# Patient Record
Sex: FEMALE | Race: WHITE | HISPANIC OR LATINO | Employment: FULL TIME | URBAN - METROPOLITAN AREA
[De-identification: names, ages, dates, MRNs, and addresses within clinical notes are randomized per-mention and may not be internally consistent; named-entity substitution may affect disease eponyms.]

---

## 2019-09-16 ENCOUNTER — OFFICE VISIT (OUTPATIENT)
Dept: CARDIOLOGY | Facility: CLINIC | Age: 69
End: 2019-09-16
Payer: MEDICARE

## 2019-09-16 VITALS
OXYGEN SATURATION: 96 % | HEIGHT: 64 IN | BODY MASS INDEX: 30.3 KG/M2 | SYSTOLIC BLOOD PRESSURE: 125 MMHG | DIASTOLIC BLOOD PRESSURE: 82 MMHG | HEART RATE: 69 BPM | WEIGHT: 177.5 LBS

## 2019-09-16 DIAGNOSIS — M25.562 CHRONIC PAIN OF BOTH KNEES: ICD-10-CM

## 2019-09-16 DIAGNOSIS — M25.561 CHRONIC PAIN OF BOTH KNEES: ICD-10-CM

## 2019-09-16 DIAGNOSIS — R60.0 LOWER EXTREMITY EDEMA: ICD-10-CM

## 2019-09-16 DIAGNOSIS — R06.02 SHORTNESS OF BREATH: ICD-10-CM

## 2019-09-16 DIAGNOSIS — I73.9 CLAUDICATION: ICD-10-CM

## 2019-09-16 DIAGNOSIS — G89.29 CHRONIC PAIN OF BOTH KNEES: ICD-10-CM

## 2019-09-16 DIAGNOSIS — I87.2 VENOUS INSUFFICIENCY OF BOTH LOWER EXTREMITIES: ICD-10-CM

## 2019-09-16 PROCEDURE — 99499 RISK ADDL DX/OHS AUDIT: ICD-10-PCS | Mod: HCNC,S$GLB,, | Performed by: INTERNAL MEDICINE

## 2019-09-16 PROCEDURE — 99204 PR OFFICE/OUTPT VISIT, NEW, LEVL IV, 45-59 MIN: ICD-10-PCS | Mod: HCNC,S$GLB,, | Performed by: INTERNAL MEDICINE

## 2019-09-16 PROCEDURE — 99499 UNLISTED E&M SERVICE: CPT | Mod: HCNC,S$GLB,, | Performed by: INTERNAL MEDICINE

## 2019-09-16 PROCEDURE — 99999 PR PBB SHADOW E&M-NEW PATIENT-LVL III: ICD-10-PCS | Mod: PBBFAC,HCNC,, | Performed by: INTERNAL MEDICINE

## 2019-09-16 PROCEDURE — 99999 PR PBB SHADOW E&M-NEW PATIENT-LVL III: CPT | Mod: PBBFAC,HCNC,, | Performed by: INTERNAL MEDICINE

## 2019-09-16 PROCEDURE — 1101F PR PT FALLS ASSESS DOC 0-1 FALLS W/OUT INJ PAST YR: ICD-10-PCS | Mod: HCNC,CPTII,S$GLB, | Performed by: INTERNAL MEDICINE

## 2019-09-16 PROCEDURE — 93000 ELECTROCARDIOGRAM COMPLETE: CPT | Mod: HCNC,S$GLB,, | Performed by: INTERNAL MEDICINE

## 2019-09-16 PROCEDURE — 99204 OFFICE O/P NEW MOD 45 MIN: CPT | Mod: HCNC,S$GLB,, | Performed by: INTERNAL MEDICINE

## 2019-09-16 PROCEDURE — 93000 EKG 12-LEAD: ICD-10-PCS | Mod: HCNC,S$GLB,, | Performed by: INTERNAL MEDICINE

## 2019-09-16 PROCEDURE — 1101F PT FALLS ASSESS-DOCD LE1/YR: CPT | Mod: HCNC,CPTII,S$GLB, | Performed by: INTERNAL MEDICINE

## 2019-09-16 NOTE — PATIENT INSTRUCTIONS
Insuficiencia venosa crónica  Los problemas con las venas en las piernas pueden llevar a you insuficiencia venosa crónica, lo que significa que hay un problema de larga duración en las venas que pueden bombear la shar de regreso al corazón. Cuando esto sucede, las angre permanece en las venas provocando hinchazón y dolor.     Dos problemas que pueden llevar a you insuficiencia venosa profunda son:  · Válvulas deterioradas. Las válvulas mantienen fluyendo la shar de las piernas a través de los vasos sanguíneos de regreso al corazón. Cuando las válvulas están deterioradas, la shar no puede fluir tan leanne.  · Trombosis venosa profunda. Pueden formarse coágulos de shar en las venas profundas de las piernas. [place] puede causar dolor, enrojecimiento e hinchazón de las piernas. También pueden bloquear el flujo de shar de regreso al corazón. Busque asistencia médcia de inmediato si tiene estos síntomas.  ·  Un coágulo de shar en you pierna puede desprenderse y desplazarse hasta los pulmones. [place] se llama embolia pulmonar. En los pulmones. el coágulo puede detener el flujo de shar, lo que puede producir dolor de pecho, dificultad para respirar, ritmo cardíaco acelerado, tos (se puede toser shar), y desmayos. [place] es you emegencia médica y puede provocar la muerte. Llame al 911 si tiene estos síntomas.  · Los proveedores de atención médica se refieren a estos dos problemas francois un tromboembolismo venoso.   La insuficiencia venosa crónica no tiene kareen, archie usted puede controlar la hinchazón en las piernas para reducir la probabilidad de ulceraciones (heridas).  Cómo reconocer los síntomas  Vigile cualquiera de estos:  · Después de estar kojo rato parado o sentado con los pies en el suelo, siente dolor o pesadez en las piernas.  · La hinchazón en los tobillos es probablemente el síntoma más común de la insuficiencia venosa crónica.  · A medida que aumenta la hinchazón, pueden aparecer manchas rojizas en la  piel de la marce de los tobillos, la cual puede también adquirir un matiz color café y un aspecto correoso o escamado, y producir picazón.  · Si la hinchazón no se controla, se puede formar you úlcera (herida abierta).  Lo que usted puede hacer  Reduzca el riesgo de desarrollar úlceras siguiendo estos consejos:  · Aumente el flujo sanguíneo que regresa al corazón elevando las piernas, haciendo ejercicio todos los wai y vistiendo medias elásticas.  · Pierda el exceso de peso para aumentar la circulación en las piernas.  · Para estimular la circulación en las piernas cuando tenga que estar parado o sentado en un lugar mirna un kojo período, mueva los dedos de los pies, cambie de posición y póngase de puntillas (levantando y bajando los talones).  Date Last Reviewed:   © 3810-3975 [employer]. [address] [place] [address], [place] [address]. Todos los derechos reservados. Esta información no pretende sustituir la atención médica profesional. Sólo cedillo médico puede diagnosticar y tratar un problema de dexter.

## 2019-09-16 NOTE — PROGRESS NOTES
Subjective:   @Patient ID:  Delmy Fairbanks is a 69 y.o. female who presents for evaluation of  B/l le pain and venous insuffiencey     HPI:    Patient was referred for bilateral lower extremity pain and edema. Patient speaks Chinese only.   She has been having bilateral knee pain, that happens at rest and with exertion. She has knee pain and feels that her knees lock in a certain position.   The pain is sometimes stabbing. Also reports LE edema that responded to lasix.  No ulcers or wounds noted.    No know arthritis problems.    She stated that she doesn't;t have any medical problem.   She smokes about 5 cigarettes a day for  Few yeas has been smoking.     No prior cardiac work up.          Patient Active Problem List    Diagnosis Date Noted    Venous insufficiency of both lower extremities 09/16/2019    Lower extremity edema 09/16/2019    Claudication 09/16/2019    Knee pain, bilateral 09/16/2019           Left Arm BP - Sitting: (P) 119/77      Review of Systems   Constitution: Negative for fever.   HENT: Negative for hearing loss and nosebleeds.    Cardiovascular: Negative for chest pain and dyspnea on exertion.   Respiratory: Negative for shortness of breath.    Hematologic/Lymphatic: Negative for bleeding problem.   Skin: Negative for itching.   Musculoskeletal:        As in HPI   Gastrointestinal: Negative for abdominal pain and hematochezia.   Genitourinary: Negative for hematuria.   Neurological: Negative for dizziness and loss of balance.   Psychiatric/Behavioral: Negative for altered mental status and depression.       Objective:   Physical Exam   Constitutional: She is oriented to person, place, and time. She appears well-developed and well-nourished.   HENT:   Head: Normocephalic and atraumatic.   Eyes: Conjunctivae are normal.   Neck: Neck supple. No JVD present.   Cardiovascular: Normal rate, regular rhythm and normal heart sounds. Exam reveals no gallop and no friction rub.   No murmur  heard.  Pulmonary/Chest: Effort normal and breath sounds normal. No stridor. No respiratory distress. She has no wheezes.   Musculoskeletal: She exhibits edema (+1 b/l ).   Neurological: She is alert and oriented to person, place, and time.   Skin: Skin is warm and dry. Rash: B/l superficial varicose veins noted on examination.    Psychiatric: She has a normal mood and affect. Her behavior is normal.       Assessment:     1. Venous insufficiency of both lower extremities    2. Lower extremity edema    3. Claudication    4. Chronic pain of both knees        Plan:     - Will check venous u/s  insuffiencey protocol  To rule out any significant insuffiencey.     - Also check ANALI, given the age and smoking hx  As well as the symptoms.     - I suspect that major part of her symptoms is related to knee issues. Will refer to orthopedic team for evaluation.  - Check Echo to evaluate systolic function and rule out cardiac etiology of the b/l LE edema.     EKG is SR , no acute ST-T wave changes.     Continue with current medical plan and lifestyle changes.    Return sooner for concerns or questions. If symptoms persist go to the ED    I have reviewed all pertinent data including patient's medical history in detail and updated the computerized patient record.     Orders Placed This Encounter   Procedures    Ambulatory referral/consult to Orthopedics     Standing Status:   Future     Standing Expiration Date:   10/16/2020     Referral Priority:   Routine     Referral Type:   Consultation     Requested Specialty:   Orthopedic Surgery     Number of Visits Requested:   1    CV Ultrasound doppler venous legs bilat     Standing Status:   Future     Standing Expiration Date:   9/16/2020    CV Ankle Brachial Indices W Stress W Toe Tracings     Standing Status:   Future     Standing Expiration Date:   9/16/2020    IN OFFICE EKG 12-LEAD (to Muse)     Order Specific Question:   Diagnosis     Answer:   Shortness of breath  [786.05.ICD-9-CM]    Echo Color Flow Doppler? Yes     Standing Status:   Future     Standing Expiration Date:   9/16/2020     Order Specific Question:   Color Flow Doppler?     Answer:   Yes       Follow up as scheduled.     She expressed verbal understanding and agreed with the plan    Patient's Medications    No medications on file

## 2019-09-16 NOTE — LETTER
September 16, 2019      Noman Faith, ALEXANDRO  [address]  [address]  [facility] [address]           [place] - Cardiology  [address]  [facility] [address]  Phone: [phone]          Patient: Delmy Fairbanks   MR Number: [id number]   YOB: 1950   Date of Visit: 9/16/2019       Dear Noman Faith:    Thank you for referring Delmy Fairbanks to me for evaluation. Attached you will find relevant portions of my assessment and plan of care.    If you have questions, please do not hesitate to call me. I look forward to following Delmy Fairbanks along with you.    Sincerely,    Cesar Arriola MD    Enclosure  CC:  No Recipients    If you would like to receive this communication electronically, please contact [email or web address] or [phone] to request more information on [employer] Link access.    For providers and/or their staff who would like to refer a patient to [facility], please contact us through our one-stop-shop provider referral line, [facility], at [phone].    If you feel you have received this communication in error or would no longer like to receive these types of communications, please e-mail [email or web address]

## 2019-09-30 ENCOUNTER — HOSPITAL ENCOUNTER (OUTPATIENT)
Dept: CARDIOLOGY | Facility: HOSPITAL | Age: 69
Discharge: HOME | End: 2019-09-30
Attending: INTERNAL MEDICINE
Payer: MEDICARE

## 2019-09-30 ENCOUNTER — HOSPITAL ENCOUNTER (OUTPATIENT)
Dept: RADIOLOGY | Facility: HOSPITAL | Age: 69
Discharge: HOME | End: 2019-09-30
Attending: INTERNAL MEDICINE
Payer: MEDICARE

## 2019-09-30 DIAGNOSIS — I87.2 VENOUS INSUFFICIENCY OF BOTH LOWER EXTREMITIES: ICD-10-CM

## 2019-09-30 DIAGNOSIS — R60.0 LOWER EXTREMITY EDEMA: ICD-10-CM

## 2019-09-30 DIAGNOSIS — I73.9 CLAUDICATION: ICD-10-CM

## 2019-09-30 LAB
AORTIC ROOT ANNULUS: 3.17 CM
AORTIC VALVE CUSP SEPERATION: 1.84 CM
AV INDEX (PROSTH): 0.88
AV MEAN GRADIENT: 3 MMHG
AV PEAK GRADIENT: 7 MMHG
AV VALVE AREA: 3.1 CM2
AV VELOCITY RATIO: 0.88
CV ECHO LV RWT: 0.42 CM
DOP CALC AO PEAK VEL: 1.35 M/S
DOP CALC AO VTI: 26.31 CM
DOP CALC LVOT AREA: 3.5 CM2
DOP CALC LVOT DIAMETER: 2.12 CM
DOP CALC LVOT PEAK VEL: 1.19 M/S
DOP CALC LVOT STROKE VOLUME: 81.64 CM3
DOP CALCLVOT PEAK VEL VTI: 23.14 CM
E WAVE DECELERATION TIME: 246.64 MSEC
E/A RATIO: 1.13
ECHO LV POSTERIOR WALL: 0.94 CM (ref 0.6–1.1)
FRACTIONAL SHORTENING: 51 % (ref 28–44)
INTERVENTRICULAR SEPTUM: 1 CM (ref 0.6–1.1)
IVRT: 0.1 MSEC
LA MAJOR: 4.83 CM
LA MINOR: 4.96 CM
LA WIDTH: 4.24 CM
LEFT ABI: 1.22
LEFT ARM BP: 119 MMHG
LEFT ATRIUM SIZE: 2.18 CM
LEFT ATRIUM VOLUME: 38.45 CM3
LEFT DORSALIS PEDIS: 147 MMHG
LEFT INTERNAL DIMENSION IN SYSTOLE: 2.2 CM (ref 2.1–4)
LEFT POSTERIOR TIBIAL: 153 MMHG
LEFT TBI: 0.65
LEFT TOE PRESSURE: 81 MMHG
LEFT VENTRICLE DIASTOLIC VOLUME: 83.51 ML
LEFT VENTRICLE SYSTOLIC VOLUME: 37.74 ML
LEFT VENTRICULAR INTERNAL DIMENSION IN DIASTOLE: 4.5 CM (ref 3.5–6)
LEFT VENTRICULAR MASS: 147 G
MV PEAK A VEL: 0.63 M/S
MV PEAK E VEL: 0.71 M/S
PISA TR MAX VEL: 2.08 M/S
PULM VEIN S/D RATIO: 1
PV PEAK D VEL: 0.34 M/S
PV PEAK S VEL: 0.34 M/S
RA MAJOR: 4.31 CM
RA PRESSURE: 3 MMHG
RIGHT ABI: 1.11
RIGHT ARM BP: 125 MMHG
RIGHT DORSALIS PEDIS: 139 MMHG
RIGHT POSTERIOR TIBIAL: 130 MMHG
RIGHT TBI: 0.44
RIGHT TOE PRESSURE: 55 MMHG
RIGHT VENTRICULAR END-DIASTOLIC DIMENSION: 2.43 CM
TR MAX PG: 17 MMHG
TREADMILL SPEED: 0.06 MPH
TREADMILL TIME: 0.58 MIN
TV REST PULMONARY ARTERY PRESSURE: 20 MMHG

## 2019-09-30 PROCEDURE — 93970 US LOWER EXTREMITY VEINS BILATERAL INSUFFICIENCY: ICD-10-PCS | Mod: 26,HCNC,, | Performed by: RADIOLOGY

## 2019-09-30 PROCEDURE — 93306 ECHO (CUPID ONLY): ICD-10-PCS | Mod: 26,HCNC,, | Performed by: INTERNAL MEDICINE

## 2019-09-30 PROCEDURE — 93924 LWR XTR VASC STDY BILAT: CPT | Mod: 50,HCNC

## 2019-09-30 PROCEDURE — 93924 LWR XTR VASC STDY BILAT: CPT | Mod: 26,HCNC,, | Performed by: INTERNAL MEDICINE

## 2019-09-30 PROCEDURE — 93970 EXTREMITY STUDY: CPT | Mod: 26,HCNC,, | Performed by: RADIOLOGY

## 2019-09-30 PROCEDURE — 93306 TTE W/DOPPLER COMPLETE: CPT | Mod: HCNC

## 2019-09-30 PROCEDURE — 93970 EXTREMITY STUDY: CPT | Mod: TC,HCNC

## 2019-09-30 PROCEDURE — 93924 CV US ANKLE BRACHIAL INDICES W STRESS W TOE TRACINGS (CUPID ONLY): ICD-10-PCS | Mod: 26,HCNC,, | Performed by: INTERNAL MEDICINE

## 2019-09-30 PROCEDURE — 93306 TTE W/DOPPLER COMPLETE: CPT | Mod: 26,HCNC,, | Performed by: INTERNAL MEDICINE

## 2019-10-01 ENCOUNTER — TELEPHONE (OUTPATIENT)
Dept: CARDIOLOGY | Facility: CLINIC | Age: 69
End: 2019-10-01

## 2019-10-01 DIAGNOSIS — R60.0 LOWER EXTREMITY EDEMA: Primary | ICD-10-CM

## 2019-10-01 RX ORDER — FUROSEMIDE 20 MG/1
20 TABLET ORAL DAILY PRN
Qty: 30 TABLET | Refills: 11 | Status: SHIPPED | OUTPATIENT
Start: 2019-10-01 | End: 2020-09-30

## 2019-10-01 NOTE — PROGRESS NOTES
The patient speaks Congolese only. Please let her know that I have reviewed the results. ECHO results is ok, just showed a little stiff heart muscle  which is common with age. Nothing needs to be done for it except I would like to prescribe low dose lasix. I don't see  preferred pharmacy, please ask her about one. Also tests that were done on her legs showed that   her legs are getting enough circulation but she has varicose veins so please instruct her to get compression stocking and wear them. Also, to elevate her legs  for 30 minutes 3-4 times a day. Will reassess next visit. Thank you.

## 2019-10-01 NOTE — TELEPHONE ENCOUNTER
Lashanda spoke with patient she was advised about the results. Pharmacy is in the chart,  you can send prescription. Patient was advised about new prescription and agrees with plan.

## 2019-10-17 ENCOUNTER — TELEPHONE (OUTPATIENT)
Dept: VASCULAR SURGERY | Facility: CLINIC | Age: 69
End: 2019-10-17

## 2019-10-17 ENCOUNTER — OFFICE VISIT (OUTPATIENT)
Dept: CARDIOLOGY | Facility: CLINIC | Age: 69
End: 2019-10-17
Payer: MEDICARE

## 2019-10-17 VITALS
HEIGHT: 64 IN | BODY MASS INDEX: 30.7 KG/M2 | SYSTOLIC BLOOD PRESSURE: 108 MMHG | DIASTOLIC BLOOD PRESSURE: 69 MMHG | HEART RATE: 65 BPM | WEIGHT: 179.81 LBS | OXYGEN SATURATION: 97 %

## 2019-10-17 DIAGNOSIS — G89.29 CHRONIC PAIN OF BOTH KNEES: Primary | ICD-10-CM

## 2019-10-17 DIAGNOSIS — I87.2 VENOUS INSUFFICIENCY OF BOTH LOWER EXTREMITIES: ICD-10-CM

## 2019-10-17 DIAGNOSIS — M25.561 CHRONIC PAIN OF BOTH KNEES: Primary | ICD-10-CM

## 2019-10-17 DIAGNOSIS — M25.562 CHRONIC PAIN OF BOTH KNEES: Primary | ICD-10-CM

## 2019-10-17 DIAGNOSIS — R60.0 LOWER EXTREMITY EDEMA: ICD-10-CM

## 2019-10-17 PROCEDURE — 1101F PR PT FALLS ASSESS DOC 0-1 FALLS W/OUT INJ PAST YR: ICD-10-PCS | Mod: HCNC,CPTII,S$GLB, | Performed by: INTERNAL MEDICINE

## 2019-10-17 PROCEDURE — 99999 PR PBB SHADOW E&M-EST. PATIENT-LVL III: ICD-10-PCS | Mod: PBBFAC,HCNC,, | Performed by: INTERNAL MEDICINE

## 2019-10-17 PROCEDURE — 1101F PT FALLS ASSESS-DOCD LE1/YR: CPT | Mod: HCNC,CPTII,S$GLB, | Performed by: INTERNAL MEDICINE

## 2019-10-17 PROCEDURE — 99999 PR PBB SHADOW E&M-EST. PATIENT-LVL III: CPT | Mod: PBBFAC,HCNC,, | Performed by: INTERNAL MEDICINE

## 2019-10-17 PROCEDURE — 99214 PR OFFICE/OUTPT VISIT, EST, LEVL IV, 30-39 MIN: ICD-10-PCS | Mod: HCNC,S$GLB,, | Performed by: INTERNAL MEDICINE

## 2019-10-17 PROCEDURE — 99214 OFFICE O/P EST MOD 30 MIN: CPT | Mod: HCNC,S$GLB,, | Performed by: INTERNAL MEDICINE

## 2019-10-17 NOTE — PROGRESS NOTES
Subjective:   @Patient ID:  Delmy Fairbanks is a 69 y.o. female who presents for follow-up of  B/l le pain and venous insuffiencey     HPI:   Here for follow up of test results.   She stated that she has been ok.  Swelling is better when she uses the compression stocking.  The knee pain is severe, she hasn't see the orthopedic team yet.  There is associated swelling worse on the right side.  She is taking lasix, but it doesn't help much.          ECHO showed normal LV function with garde II DD.  ANALI is normal.   U/S showed B/l  GSV insuffiencey.      No ulcers or wounds noted.        She smokes about 5 cigarettes a day for few yeas has been smoking.               Patient Active Problem List    Diagnosis Date Noted    Venous insufficiency of both lower extremities 09/16/2019    Lower extremity edema 09/16/2019    Claudication 09/16/2019    Knee pain, bilateral 09/16/2019                  Review of Systems   Constitution: Negative for fever.   HENT: Negative for hearing loss and nosebleeds.    Cardiovascular: Negative for chest pain and dyspnea on exertion.   Respiratory: Negative for shortness of breath.    Hematologic/Lymphatic: Negative for bleeding problem.   Skin: Negative for itching.   Musculoskeletal:        As in HPI   Gastrointestinal: Negative for abdominal pain and hematochezia.   Genitourinary: Negative for hematuria.   Neurological: Negative for dizziness and loss of balance.   Psychiatric/Behavioral: Negative for altered mental status and depression.       Objective:   Physical Exam   Constitutional: She is oriented to person, place, and time. She appears well-developed and well-nourished.   HENT:   Head: Normocephalic and atraumatic.   Eyes: Conjunctivae are normal.   Neck: Neck supple. No JVD present.   Cardiovascular: Normal rate, regular rhythm and normal heart sounds. Exam reveals no gallop and no friction rub.   No murmur heard.  Pulmonary/Chest: Effort normal and breath sounds normal.  No stridor. No respiratory distress. She has no wheezes.   Musculoskeletal: She exhibits edema (+1 b/l ).   Neurological: She is alert and oriented to person, place, and time.   Skin: Skin is warm and dry. Rash: B/l superficial varicose veins noted on examination.    Psychiatric: She has a normal mood and affect. Her behavior is normal.       Assessment:     1. Chronic pain of both knees    2. Lower extremity edema    3. Venous insufficiency of both lower extremities        Plan:     -  I believe a major part of her symptoms related to the arthritis.   -  Will refer for ortho for evaluation   -  Continue compression stocking and leg elevation for the venous insuffiencey.   -  Continue lasix for diastolic dysfunction.   - After  Knee evaluation if the patient remains symptomatic may consider intervention ot the venous insuffiencey.       Continue with current medical plan and lifestyle changes.    Return sooner for concerns or questions. If symptoms persist go to the ED    I have reviewed all pertinent data including patient's medical history in detail and updated the computerized patient record.     Orders Placed This Encounter   Procedures    Ambulatory referral/consult to Orthopedics     Standing Status:   Future     Standing Expiration Date:   11/17/2020     Referral Priority:   Routine     Referral Type:   Consultation     Requested Specialty:   Orthopedic Surgery     Number of Visits Requested:   1       Follow up as scheduled.     She expressed verbal understanding and agreed with the plan    Patient's Medications   New Prescriptions    No medications on file   Previous Medications    FUROSEMIDE (LASIX) 20 MG TABLET    Take 1 tablet (20 mg total) by mouth daily as needed (For Lower extremity  swelling.).   Modified Medications    No medications on file   Discontinued Medications    No medications on file

## 2019-10-17 NOTE — PATIENT INSTRUCTIONS
How to Quit Smoking  Smoking is one of the hardest habits to break. About half of all people who have ever smoked have been able to quit. Most people who still smoke want to quit. Here are some of the best ways to stop smoking.    Keep trying  Most smokers make many attempts at quitting before they are successful. Its important not to give up.  Go cold turkey  Most former smokers quit cold turkey (all at once). Trying to cut back gradually doesn't seem to work as well, perhaps because it continues the smoking habit. Also, it is possible to inhale more while smoking fewer cigarettes. This results in the same amount of nicotine in your body.  Get support  Support programs can be a big help, especially for heavy smokers. These groups offer lectures, ways to change behavior, and peer support. Here are some ways to find a support program:  · Free national quitline: 800-QUIT-NOW (251-029-8165).  · Hospital quit-smoking programs.  · American Lung Association: (781.603.8454).  · American Cancer Society (778-226-0115).  Support at home is important too. Nonsmokers can offer praise and encouragement. If the smoker in your life finds it hard to quit, encourage them to keep trying.  Over-the-counter medicines  Nicotine replacement therapy may make quitting easier. Certain aids, such as the nicotine patch, gum, and lozenges, are available without a prescription. It is best to use these under a doctors care, though. The skin patch provides a steady supply of nicotine. Nicotine gum and lozenges give temporary bursts of low levels of nicotine. Both methods reduce the craving for cigarettes. Warning: If you have nausea, vomiting, dizziness, weakness, or a fast heartbeat, stop using these products and see your doctor.  Prescription medicines  After reviewing your smoking patterns and past attempts to quit, your doctor may offer a prescription medicine such as bupropion, varenicline, a nicotine inhaler, or nasal spray. Each has  "advantages and side effects. Your doctor can review these with you.  Health benefits of quitting  The benefits of quitting start right away and keep improving the longer you go without smoking. These benefits occur at any age.  So whether you are 17 or 70, quitting is a good decision. Some of the benefits include:  · 20 minutes: Blood pressure and pulse return to normal.  · 8 hours: Oxygen levels return to normal.  · 2 days: Ability to smell and taste begin to improve as damaged nerves regrow.  · 2 to 3 weeks: Circulation and lung function improve.  · 1 to 9 months: Coughing, congestion, and shortness of breath decrease; tiredness decreases.  · 1 year: Risk of heart attack decreases by half.  · 5 years: Risk of lung cancer decreases by half; risk of stroke becomes the same as a nonsmokers.  For more on how to quit smoking, try these online resources:   · Smokefree.gov  · "Clearing the Air" booklet from the National Cancer Adams: smokefree.gov/sites/default/files/pdf/clearing-the-air-accessible.pdf  Date Last Reviewed: 3/1/2017  © 6034-3631 [employer]. [address] [place] [address]. All rights reserved. This information is not intended as a substitute for professional medical care. Always follow your healthcare professional's instructions.          "

## 2019-10-23 ENCOUNTER — HOSPITAL ENCOUNTER (OUTPATIENT)
Dept: RADIOLOGY | Facility: HOSPITAL | Age: 69
Discharge: HOME | End: 2019-10-23
Attending: PHYSICIAN ASSISTANT
Payer: MEDICARE

## 2019-10-23 ENCOUNTER — OFFICE VISIT (OUTPATIENT)
Dept: ORTHOPEDICS | Facility: CLINIC | Age: 69
End: 2019-10-23
Payer: MEDICARE

## 2019-10-23 VITALS — HEIGHT: 64 IN | WEIGHT: 179 LBS | BODY MASS INDEX: 30.56 KG/M2

## 2019-10-23 DIAGNOSIS — G89.29 CHRONIC PAIN OF BOTH KNEES: ICD-10-CM

## 2019-10-23 DIAGNOSIS — M25.562 CHRONIC PAIN OF BOTH KNEES: ICD-10-CM

## 2019-10-23 DIAGNOSIS — M25.561 CHRONIC PAIN OF BOTH KNEES: ICD-10-CM

## 2019-10-23 DIAGNOSIS — M17.0 PRIMARY OSTEOARTHRITIS OF BOTH KNEES: Primary | ICD-10-CM

## 2019-10-23 PROCEDURE — 1101F PT FALLS ASSESS-DOCD LE1/YR: CPT | Mod: HCNC,CPTII,S$GLB, | Performed by: PHYSICIAN ASSISTANT

## 2019-10-23 PROCEDURE — 99204 OFFICE O/P NEW MOD 45 MIN: CPT | Mod: HCNC,S$GLB,, | Performed by: PHYSICIAN ASSISTANT

## 2019-10-23 PROCEDURE — 1101F PR PT FALLS ASSESS DOC 0-1 FALLS W/OUT INJ PAST YR: ICD-10-PCS | Mod: HCNC,CPTII,S$GLB, | Performed by: PHYSICIAN ASSISTANT

## 2019-10-23 PROCEDURE — 73564 X-RAY EXAM KNEE 4 OR MORE: CPT | Mod: TC,50,HCNC

## 2019-10-23 PROCEDURE — 99999 PR PBB SHADOW E&M-EST. PATIENT-LVL III: CPT | Mod: PBBFAC,HCNC,, | Performed by: PHYSICIAN ASSISTANT

## 2019-10-23 PROCEDURE — 73564 XR KNEE ORTHO BILAT WITH FLEXION: ICD-10-PCS | Mod: 26,50,HCNC, | Performed by: RADIOLOGY

## 2019-10-23 PROCEDURE — 99204 PR OFFICE/OUTPT VISIT, NEW, LEVL IV, 45-59 MIN: ICD-10-PCS | Mod: HCNC,S$GLB,, | Performed by: PHYSICIAN ASSISTANT

## 2019-10-23 PROCEDURE — 99999 PR PBB SHADOW E&M-EST. PATIENT-LVL III: ICD-10-PCS | Mod: PBBFAC,HCNC,, | Performed by: PHYSICIAN ASSISTANT

## 2019-10-23 PROCEDURE — 73564 X-RAY EXAM KNEE 4 OR MORE: CPT | Mod: 26,50,HCNC, | Performed by: RADIOLOGY

## 2019-10-23 RX ORDER — NAPROXEN 500 MG/1
500 TABLET ORAL 2 TIMES DAILY WITH MEALS
Qty: 60 TABLET | Refills: 0 | Status: SHIPPED | OUTPATIENT
Start: 2019-10-23

## 2019-10-23 RX ORDER — CHLORTHALIDONE 25 MG/1
25 TABLET ORAL DAILY
COMMUNITY

## 2019-10-23 RX ORDER — ERGOCALCIFEROL 1.25 MG/1
50000 CAPSULE ORAL
COMMUNITY

## 2019-10-23 NOTE — LETTER
October 23, 2019      Cesar Arriola MD  [address] [place] [address]           [facility] - Orthopedics  [address]  [facility] [address]  Phone: [phone]          Patient: Delmy Fairbanks   MR Number: [id number]   YOB: 1950   Date of Visit: 10/23/2019       Dear Dr. Cesar Arriola:    Thank you for referring Delmy Fairbanks to me for evaluation. Attached you will find relevant portions of my assessment and plan of care.    If you have questions, please do not hesitate to call me. I look forward to following Delmy Fairbanks along with you.    Sincerely,    Shiv Birmingham PA-C    Enclosure  CC:  No Recipients    If you would like to receive this communication electronically, please contact [email or web address] or [phone] to request more information on [employer] Link access.    For providers and/or their staff who would like to refer a patient to [facility], please contact us through our one-stop-shop provider referral line, [facility], at [phone].    If you feel you have received this communication in error or would no longer like to receive these types of communications, please e-mail [email or web address]

## 2019-10-23 NOTE — PROGRESS NOTES
SUBJECTIVE:     Chief Complaint & History of Present Illness:  Delmy Fairbanks is a 69 y.o. year old female, new patient referred to Orthopedics by Dr. Arriola, here with a history of constant bilateral knee pain, right worse than left, which started 2+ years ago.  There is not a history of trauma.  The pain is located in the global aspect of the knee.  The pain is described as achy, 10/10.  There is radiation down her right tibia.  There is catching and locking of her right knee.  Aggravating factors include any weight bearing.  Associated symptoms include effusion, knee giving out, crepitus sensation.  There is not numbness or tingling of the lower extremity.  There is not back pain. Previous treatments include brace, ice, OTC NSAIDs and rest which have provided minimal relief.  There is not a history of previous injury or surgery to the knee.  The patient does not use an assistive device.    Patient recently moved from [place] where she saw an Orthopedist initially.  She was told she had severe osteoarthritis of her right knee and that her left knee was bothering her due to overcompensation.      present to assist with HPI.    Review of patient's allergies indicates:  No Known Allergies      Current Outpatient Medications   Medication Sig Dispense Refill    chlorthalidone (HYGROTEN) 25 MG Tab Take 25 mg by mouth once daily.      ergocalciferol (VITAMIN D2) 50,000 unit Cap Take 50,000 Units by mouth every 7 days.      furosemide (LASIX) 20 MG tablet Take 1 tablet (20 mg total) by mouth daily as needed (For Lower extremity  swelling.). 30 tablet 11     No current facility-administered medications for this visit.        Past Medical History:   Diagnosis Date    Hypertension        History reviewed. No pertinent surgical history.    Vital Signs (Most Recent)  There were no vitals filed for this visit.    Review of Systems:  ROS:  Constitutional: no fever or chills  Eyes: no visual changes  ENT:  "no nasal congestion or sore throat  Respiratory: no cough or shortness of breath  Cardiovascular: no chest pain or palpitations  Gastrointestinal: no nausea or vomiting, tolerating diet  Genitourinary: no hematuria or dysuria  Integument/Breast: no rash or pruritis  Hematologic/Lymphatic: no easy bruising or lymphadenopathy  Musculoskeletal: positive for bilateral knee pain  Neurological: no seizures or tremors  Behavioral/Psych: no auditory or visual hallucinations  Endocrine: no heat or cold intolerance    OBJECTIVE:     PHYSICAL EXAM:  PE:  Ht 5' 4" (1.626 m)   Wt 81.2 kg (179 lb)   BMI 30.73 kg/m²   General: Pleasant, cooperative, NAD   HEENT: NCAT, sclera nonicteric   Lungs: Respirations are equal and unlabored.   Abdomen: Soft and non-tender.  CV: 2+ bilateral upper and lower extremity pulses.   Skin: Intact throughout LE with no rashes, erythema, or lesions  Extremities: No LE edema, NVI lower extremities    left Knee Exam:  Knee Range of Motion:0-100 degrees flexion   Effusion:not significant  Condition of skin:intact  Location of tenderness:Medial joint line   Strength:5 of 5  Stability:  Lachman: stable, LCL: stable, MCL: stable, PCL: stable and posteromedial (dial): stable  Varus /Valgus stress:  normal  Katja:   negative    right Knee Exam:  Knee Range of Motion:5-85 degrees flexion   Effusion:not significant  Condition of skin:intact  Location of tenderness:Lateral joint line   Strength:5 of 5  Stability:  Lachman: stable, LCL: stable, MCL: stable, PCL: stable and posteromedial (dial): stable  Varus /Valgus stress:   Mild valgus  Katja:  equivocal    RADIOGRAPHS:  Xray of bilateral knee taken in clinic today, images reviewed by me, demonstrates severe narrowing of lateral tibiofemoral joint space and patellofemoral joint space of right knee.  Mild narrowing of medial tibiofemoral and patellofemoral joint space of left knee without evidence of fracture or dislocation.    ASSESSMENT/PLAN: "     Plan: We discussed with the patient at length all the different treatment options available for the knee including anti-inflammatories, acetaminophen, rest, ice, knee strengthening exercise, occasional cortisone injections for temporary relief, Viscosupplimentation injections, arthroscopic debridement osteotomy, and finally knee arthroplasty.   Patient with severe osteoarthritis of right knee and mild OA of left knee with mechanical symptoms as well in the right knee.  She would like to have a more permanent fix of her knee pain rather than temporary.    1. Patient scheduled with Dr. Baker to discuss right TKA.  2. Prescribed Naproxen 500mg PO BID to be taken with food to reduce gastric side effects.  3. Given knee conditioning program in clinic.  4. RICE treatment  5. Discussed that she will need pre op clearance for vascular medical conditions prior to surgery as well as general pre op clearance.     present during entire visit.    She verbalized understanding of plan.    Final Diagnosis: Primary osteoarthritis knee, bilateral. Bilateral knee pain.

## 2019-10-31 ENCOUNTER — TELEPHONE (OUTPATIENT)
Dept: PREADMISSION TESTING | Facility: HOSPITAL | Age: 69
End: 2019-10-31

## 2019-10-31 ENCOUNTER — OFFICE VISIT (OUTPATIENT)
Dept: ORTHOPEDICS | Facility: CLINIC | Age: 69
End: 2019-10-31
Payer: MEDICARE

## 2019-10-31 VITALS — WEIGHT: 176.69 LBS | HEIGHT: 63 IN | BODY MASS INDEX: 31.31 KG/M2

## 2019-10-31 DIAGNOSIS — M79.606 PAIN OF LOWER EXTREMITY, UNSPECIFIED LATERALITY: Primary | ICD-10-CM

## 2019-10-31 DIAGNOSIS — Z01.818 PRE-OP TESTING: ICD-10-CM

## 2019-10-31 DIAGNOSIS — M17.11 PRIMARY OSTEOARTHRITIS OF RIGHT KNEE: Primary | ICD-10-CM

## 2019-10-31 PROCEDURE — 1101F PR PT FALLS ASSESS DOC 0-1 FALLS W/OUT INJ PAST YR: ICD-10-PCS | Mod: HCNC,CPTII,S$GLB, | Performed by: ORTHOPAEDIC SURGERY

## 2019-10-31 PROCEDURE — 1101F PT FALLS ASSESS-DOCD LE1/YR: CPT | Mod: HCNC,CPTII,S$GLB, | Performed by: ORTHOPAEDIC SURGERY

## 2019-10-31 PROCEDURE — 99214 OFFICE O/P EST MOD 30 MIN: CPT | Mod: HCNC,S$GLB,, | Performed by: ORTHOPAEDIC SURGERY

## 2019-10-31 PROCEDURE — 99999 PR PBB SHADOW E&M-EST. PATIENT-LVL II: ICD-10-PCS | Mod: PBBFAC,HCNC,, | Performed by: ORTHOPAEDIC SURGERY

## 2019-10-31 PROCEDURE — 99214 PR OFFICE/OUTPT VISIT, EST, LEVL IV, 30-39 MIN: ICD-10-PCS | Mod: HCNC,S$GLB,, | Performed by: ORTHOPAEDIC SURGERY

## 2019-10-31 PROCEDURE — 99999 PR PBB SHADOW E&M-EST. PATIENT-LVL II: CPT | Mod: PBBFAC,HCNC,, | Performed by: ORTHOPAEDIC SURGERY

## 2019-10-31 NOTE — TELEPHONE ENCOUNTER
----- Message from Aylin Morelos LPN sent at 10/31/2019  3:33 PM CDT -----  Surgery 11/18/19    Patient needs CBC,BMP,PT/INR,POC AND OPOC APPT.     FILOMENAI-This pt is Indonesian speaking, she will need a . Call her daughter in law to schedule appts [phone], you will need a  to call. The number for  is [phone] if you need it.

## 2019-10-31 NOTE — LETTER
November 4, 2019      Shiv Birmingham PA-C  [address]  [facility] [address]           [facility] - Orthopedics  [address]  [facility] [address]  Phone: [phone]          Patient: Delmy Fairbanks   MR Number: [id number]   YOB: 1950   Date of Visit: 10/31/2019       Dear Shiv Birmingham:    Thank you for referring Delmy Fairbanks to me for evaluation. Attached you will find relevant portions of my assessment and plan of care.    If you have questions, please do not hesitate to call me. I look forward to following Delmy Fairbanks along with you.    Sincerely,    Gene Baker III, MD    Enclosure  CC:  No Recipients    If you would like to receive this communication electronically, please contact [email or web address] or [phone] to request more information on [employer] Link access.    For providers and/or their staff who would like to refer a patient to [facility], please contact us through our one-stop-shop provider referral line, [facility], at [phone].    If you feel you have received this communication in error or would no longer like to receive these types of communications, please e-mail [email or web address]

## 2019-11-05 NOTE — PROGRESS NOTES
Subjective:     HPI:   Delmy Fairbanks is a 69 y.o. female who presents for evaluation of right knee pain.  Interview was conducted through an  patient speaks Thai    She was seen by Von Birmingham on October 23rd diagnosed with right knee osteoarthritis and referred here for knee replacement    Patient has had many years of global right knee pain predominantly lateral-sided moderate to severe activity raised relieved with rest    She was given a prescription for naproxen recently she has tried other over-the-counter anti-inflammatories as well.  She is seen several other orthopedic surgeons in [place] apparently no one offered her an injection.  She was given knee home exercise program that she started last week.  She has tried a compressive knee sleeve. No assistive devices.  In most she could walk 2 blocks.  Limitations include walking working cleaning the house activities of daily living and shopping.    They recently moved from [place].  Her daughter and 2-year-old son her here with her today the patient lives with her daughter and son-in-law who will help her out after surgery       ROS:  The updated medical history is in the chart and has been reviewed. A review of systems is updated and there is no reported vision changes, ear/nose/mouth/throat complaints,  chest pain, shortness of breath, abdominal pain, urological complaints, fevers or chills, psychiatric complaints. Musculoskeletal and neurologcial symptoms are as documented. All other systems are negative.      Objective:   Exam:  There were no vitals filed for this visit.  Body mass index is 31.3 kg/m².    Physical examination included assessment of the patient's general appearance with particular attention to development, nutrition, body habitus, attention to grooming, and any evidence of distress.  Constitutional: The patient is a well-developed, well-nourished patient in no acute distress.   Cardiovascular: Vascular examination included  warmth and capillary refill as well inspection for edema and assessment of pedal pulses. Pulses are palpable and regular.  Musculoskeletal: Gait was assessed as to whether it was steady, non-antalgic, and/or required the use of an assist device. The patient was also asked to walk independently and get onto the examination table.  Skin: The skin was examined for any obvious rashes or lesions in the extremity.  Neurologic: Sensation is intact to light touch in the extremity. The patient has good coordination without hyperreflexia and is alert and oriented to person, place and time and has normal mood and affect.     All of the above were examined and found to be within normal limits except for the following pertinent clinical findings:    Antalgic gait with a limp favoring the right knee 7-125 degree knee range of motion 10° valgus that corrects to 5 degree valgus she is tender palpation predominant the lateral joint line she has moderate effusion knee stable to anterior-posterior varus and valgus stresses with significant flexion contracture but no extensor lag she has a 2+ DP dorsalis pedis pulse foot is warm and well perfused      Imaging:  Indication:  Right knee pain  Exam Ordered: Radiographs of the right knee include a standing anteroposterior view, a standing posterioanterior view, a lateral view in full flexion, and a sunrise view  Details of Examination:  October 23rd exam shows evidence of joint space narrowing, osteophyte formation, and subchondral sclerosis, all consistent with degenerative arthritis of the knee.  No other significant findings are noted.  Impression:  Degenerative Arthritis, Right Knee    Severe valgus knee arthritis Tonnis grade 3 with obliteration of lateral joint space as well as tricompartmental joint space narrowing osteophyte formation and subchondral sclerosis  Radiology report from October 23rd says moderate to severe arthritis. This is actually severe end-stage grade 3  osteoarthritis      Assessment:     Primary osteoarthritis of right knee [M17.11]  Severe valgus knee arthritis Tonnis grade 3 with obliteration of lateral joint space    Recent evaluation for lower extremity edema patient ABIs greater than 1 based off arterial ultrasounds done September 30th essentially normal  Patient admits to smoking 2 cigarettes per day will stop smoking prior to surgery     Plan:       This patient has significant symptoms in their knee that are affecting their quality of life and daily activities.  They have tried non-operative treatment including analgesics, an exercise program, and activity modification, but the symptoms have persisted. I believe they make a good candidate for knee arthroplasty.     The risks and benefits of knee arthroplasty have been discussed with the patient which include, but are not limited to infections (including severe sequelae), potential component failure, fracture, DVT, pulmonary embolus, nerve palsy, poor range of motion, the possibility of bone grafting, persistent pain, wound healing complications, transfusions, medical complications and death.     Pre-operative planning will include the following:  A pre-surgical evaluation by will be arranged.  Pre-op orders will be placed.  We will make arrangements with the operating room for proper time and staffing.  We will make Social Service arrangements for the patient.    Location: [place]       Implants:   Company: [employer]  System: Sigma with MBT/TC3 backup    DVT prophylaxis: ASA 81mg BID x1 month  Dispo:  PT     We will plan on a right total knee replacement November 18th and [place].  She will need an .  1-2 night hospital stay home health therapy as they say they cannot afford or navigate outpatient therapy.  Patient will stop smoking prior to the procedure          No orders of the defined types were placed in this encounter.      Past Medical History:   Diagnosis Date    Hypertension        No  past surgical history on file.    No family history on file.    Social History     Socioeconomic History    Marital status:      Spouse name: Not on file    Number of children: Not on file    Years of education: Not on file    Highest education level: Not on file   Occupational History    Not on file   Social Needs    Financial resource strain: Not on file    Food insecurity:     Worry: Not on file     Inability: Not on file    Transportation needs:     Medical: Not on file     Non-medical: Not on file   Tobacco Use    Smoking status: Current Every Day Smoker     Types: Cigarettes   Substance and Sexual Activity    Alcohol use: Not on file    Drug use: Not on file    Sexual activity: Not on file   Lifestyle    Physical activity:     Days per week: Not on file     Minutes per session: Not on file    Stress: Not on file   Relationships    Social connections:     Talks on phone: Not on file     Gets together: Not on file     Attends [employer] service: Not on file     Active member of club or organization: Not on file     Attends meetings of clubs or organizations: Not on file     Relationship status: Not on file   Other Topics Concern    Not on file   Social History Narrative    Not on file

## 2019-11-11 ENCOUNTER — ANESTHESIA EVENT (OUTPATIENT)
Dept: SURGERY | Facility: HOSPITAL | Age: 69
End: 2019-11-11
Payer: MEDICARE

## 2019-11-11 NOTE — PRE ADMISSION SCREENING
Anesthesia Assessment: Preoperative EQUATION    Planned Procedure: Procedure(s) (LRB):  ARTHROPLASTY, KNEE, TOTAL-DEPUY-SIGMA/DEPUY-TC3/MBT back up (Right)  Requested Anesthesia Type:Regional  Surgeon: Gene Baker III, MD  Service: Orthopedics  Known or anticipated Date of Surgery:11/18/2019    Surgeon notes: reviewed    Previous anesthesia records:Not available    Last PCP note: unknown if pt is followed by outside PCP  Subspecialty notes: Cardiology: General Dr Arriola at [place] 10/17/19 follow up for leg pain, venous insufficiency    Other important co-morbidities: HTN     Tests already available:  EKG 9/16/19, ECHO 9/30/19            Plan:    Testing:  Hematology Profile, BMP and PT/INR   Pre-anesthesia  visit       Visit focus: possible regional anesthesia and/or nerve block      Consultation:IM Perioperative Hospitalist     Navigation: Tests Scheduled.              Consults scheduled.             Results will be tracked by Preop Clinic.  PT NEEDS

## 2019-11-11 NOTE — ANESTHESIA PREPROCEDURE EVALUATION
Anesthesia Assessment: Preoperative EQUATION     Planned Procedure: Procedure(s) (LRB):  ARTHROPLASTY, KNEE, TOTAL-DEPUY-SIGMA/DEPUY-TC3/MBT back up (Right)  Requested Anesthesia Type:Regional  Surgeon: Gene Baker III, MD  Service: Orthopedics  Known or anticipated Date of Surgery:11/18/2019     Surgeon notes: reviewed     Previous anesthesia records:Not available     Last PCP note: unknown if pt is followed by outside PCP  Subspecialty notes: Cardiology: General Dr Arriola at [place] 10/17/19 follow up for leg pain, venous insufficiency     Other important co-morbidities: HTN     Tests already available:  EKG 9/16/19, ECHO 9/30/19                            Plan:    Testing:  Hematology Profile, BMP and PT/INR   Pre-anesthesia  visit                                        Visit focus: possible regional anesthesia and/or nerve block                            Consultation:IM Perioperative Hospitalist                           Navigation: Tests Scheduled.                         Consults scheduled.                        Results will be tracked by Preop Clinic.  PT NEEDS                               Electronically signed by Xiao Rg RN at 11/11/2019 12:08 PM                                                                                                                    11/11/2019  Delmy Fairbanks is a 69 y.o., female.    Anesthesia Evaluation    I have reviewed the Patient Summary Reports.    I have reviewed the Nursing Notes.   I have reviewed the Medications.     Review of Systems  Anesthesia Hx:  No previous Anesthesia  Denies Family Hx of Anesthesia complications.    Social:  Smoker  Tobacco Use:  (quit 11/3/19) of cigarette for 3 years, < 1/2 a pack per day, quit smoking within the year Denies Alcohol Use.   Hematology/Oncology:  Hematology Normal   Oncology Normal     EENT/Dental:EENT/Dental Normal   Cardiovascular:   Exercise tolerance: good Hypertension Diastolic  dysfunction II; EF 55% Functional Capacity good / => 4 METS, chores: housecleaning, sweeping, mopping, painting: denies CP/SOB  Denies Coronary Artery Disease.  Chronic Venous Insufficiency, bilateral lower extremityDenies Deep Venous Thrombosis (DVT)  Hypertension    Pulmonary:  Pulmonary Normal    Renal/:  Renal/ Normal     Hepatic/GI:  Hepatic/GI Normal    Musculoskeletal:   Arthritis   Joint Disease:  Arthritis, Osteoarthritis    Neurological:  Neurology Normal  Denies Pain  Osteoarthritis    Endocrine:  Endocrine Normal    Dermatological:  Skin Normal    Psych:  Psychiatric Normal           Physical Exam  General:  Obesity    Airway/Jaw/Neck:  Airway Findings: Mouth Opening: Normal Tongue: Normal  General Airway Assessment: Adult, Average  Mallampati: III  Improves to II with phonation.  TM Distance: Normal, at least 6 cm  Jaw/Neck Findings:  Neck ROM: Normal ROM     Eyes/Ears/Nose:  EYES/EARS/NOSE FINDINGS: Normal   Dental:  Dental Findings: In tact   Chest/Lungs:  Chest/Lungs Findings: Clear to auscultation, Normal Respiratory Rate     Heart/Vascular:  Heart Findings: Rate: Normal  Rhythm: Regular Rhythm  Sounds: Normal  Heart murmur: negative Vascular Findings: Normal    Abdomen:  Abdomen Findings: Normal    Musculoskeletal:  Musculoskeletal Findings: Normal   Skin:  Skin Findings: Normal    Mental Status:  Mental Status Findings:  Cooperative, Alert and Oriented       Interview done with     Anesthesia Plan  Type of Anesthesia, risks & benefits discussed:  Anesthesia Type:  CSE, epidural, spinal, general  Patient's Preference:   Intra-op Monitoring Plan: standard ASA monitors  Intra-op Monitoring Plan Comments:   Post Op Pain Control Plan: peripheral nerve block  Post Op Pain Control Plan Comments:   Induction:   IV  Beta Blocker:  Patient is not currently on a Beta-Blocker (No further documentation required).       Informed Consent: Patient understands risks and agrees with  Anesthesia plan.  Questions answered. Anesthesia consent signed with patient.  ASA Score: 2     Day of Surgery Review of History & Physical:            Ready For Surgery From Anesthesia Perspective.     The patient was seen by Perioperative Internal Medicine physician Dr. Lugo on 11/12/19, please see recommendations.    Discharge Plan: To home with daughter-in-law (Delmy: [phone])- will need     Justin Moody RN

## 2019-11-12 ENCOUNTER — HOSPITAL ENCOUNTER (OUTPATIENT)
Dept: RADIOLOGY | Facility: HOSPITAL | Age: 69
Discharge: HOME | End: 2019-11-12
Attending: NURSE PRACTITIONER
Payer: MEDICARE

## 2019-11-12 ENCOUNTER — HOSPITAL ENCOUNTER (OUTPATIENT)
Dept: PREADMISSION TESTING | Facility: HOSPITAL | Age: 69
Discharge: HOME | End: 2019-11-12
Attending: ANESTHESIOLOGY
Payer: MEDICARE

## 2019-11-12 ENCOUNTER — OFFICE VISIT (OUTPATIENT)
Dept: ORTHOPEDICS | Facility: CLINIC | Age: 69
End: 2019-11-12
Payer: MEDICARE

## 2019-11-12 ENCOUNTER — INITIAL CONSULT (OUTPATIENT)
Dept: INTERNAL MEDICINE | Facility: CLINIC | Age: 69
End: 2019-11-12
Payer: MEDICARE

## 2019-11-12 VITALS
OXYGEN SATURATION: 97 % | WEIGHT: 180.63 LBS | HEART RATE: 57 BPM | SYSTOLIC BLOOD PRESSURE: 136 MMHG | HEIGHT: 64 IN | DIASTOLIC BLOOD PRESSURE: 78 MMHG | TEMPERATURE: 98 F | BODY MASS INDEX: 30.84 KG/M2

## 2019-11-12 DIAGNOSIS — I10 ESSENTIAL HYPERTENSION: ICD-10-CM

## 2019-11-12 DIAGNOSIS — Z72.0 TOBACCO ABUSE: ICD-10-CM

## 2019-11-12 DIAGNOSIS — M17.11 PRIMARY OSTEOARTHRITIS OF RIGHT KNEE: Primary | ICD-10-CM

## 2019-11-12 DIAGNOSIS — R60.0 LOWER EXTREMITY EDEMA: ICD-10-CM

## 2019-11-12 DIAGNOSIS — I73.9 CLAUDICATION: ICD-10-CM

## 2019-11-12 DIAGNOSIS — M17.11 PRIMARY OSTEOARTHRITIS OF RIGHT KNEE: ICD-10-CM

## 2019-11-12 DIAGNOSIS — Z96.651 S/P TOTAL KNEE REPLACEMENT USING CEMENT, RIGHT: ICD-10-CM

## 2019-11-12 DIAGNOSIS — I51.89 DIASTOLIC DYSFUNCTION: ICD-10-CM

## 2019-11-12 DIAGNOSIS — I87.2 VENOUS INSUFFICIENCY OF BOTH LOWER EXTREMITIES: ICD-10-CM

## 2019-11-12 DIAGNOSIS — Z01.818 PREOP EXAMINATION: Primary | ICD-10-CM

## 2019-11-12 PROCEDURE — 99204 OFFICE O/P NEW MOD 45 MIN: CPT | Mod: HCNC,S$GLB,, | Performed by: HOSPITALIST

## 2019-11-12 PROCEDURE — 73560 XR KNEE 1 OR 2 VIEW RIGHT: ICD-10-PCS | Mod: 26,HCNC,RT, | Performed by: RADIOLOGY

## 2019-11-12 PROCEDURE — 99999 PR PBB SHADOW E&M-EST. PATIENT-LVL II: CPT | Mod: PBBFAC,HCNC,, | Performed by: NURSE PRACTITIONER

## 2019-11-12 PROCEDURE — 99999 PR PBB SHADOW E&M-EST. PATIENT-LVL II: ICD-10-PCS | Mod: PBBFAC,HCNC,, | Performed by: NURSE PRACTITIONER

## 2019-11-12 PROCEDURE — 99999 PR PBB SHADOW E&M-EST. PATIENT-LVL I: ICD-10-PCS | Mod: PBBFAC,HCNC,, | Performed by: HOSPITALIST

## 2019-11-12 PROCEDURE — 99499 NO LOS: ICD-10-PCS | Mod: HCNC,S$GLB,, | Performed by: NURSE PRACTITIONER

## 2019-11-12 PROCEDURE — 99499 UNLISTED E&M SERVICE: CPT | Mod: HCNC,S$GLB,, | Performed by: NURSE PRACTITIONER

## 2019-11-12 PROCEDURE — 1101F PR PT FALLS ASSESS DOC 0-1 FALLS W/OUT INJ PAST YR: ICD-10-PCS | Mod: HCNC,CPTII,S$GLB, | Performed by: HOSPITALIST

## 2019-11-12 PROCEDURE — 99204 PR OFFICE/OUTPT VISIT, NEW, LEVL IV, 45-59 MIN: ICD-10-PCS | Mod: HCNC,S$GLB,, | Performed by: HOSPITALIST

## 2019-11-12 PROCEDURE — 73560 X-RAY EXAM OF KNEE 1 OR 2: CPT | Mod: TC,HCNC,RT

## 2019-11-12 PROCEDURE — 1101F PT FALLS ASSESS-DOCD LE1/YR: CPT | Mod: HCNC,CPTII,S$GLB, | Performed by: HOSPITALIST

## 2019-11-12 PROCEDURE — 73560 X-RAY EXAM OF KNEE 1 OR 2: CPT | Mod: 26,HCNC,RT, | Performed by: RADIOLOGY

## 2019-11-12 PROCEDURE — 99999 PR PBB SHADOW E&M-EST. PATIENT-LVL I: CPT | Mod: PBBFAC,HCNC,, | Performed by: HOSPITALIST

## 2019-11-12 RX ORDER — ACETAMINOPHEN 500 MG
500 TABLET ORAL
COMMUNITY

## 2019-11-12 NOTE — ASSESSMENT & PLAN NOTE
History does not suggest Typical of Claudication at this time   Vascular US- Sept 2019     The right resting ANALI is normal.   The right ankle [DT] artery has triphasic flow.   The right ankle [DP] artery has triphasic flow.   The left resting ANALI is normal.     Exercise Study: treadmill test.   The exercise study was stopped due to leg fatigue and patient request.     Immediately post exercise, the right ANALI is normal.  Immediately post exercise, the left ANALI is normal.

## 2019-11-12 NOTE — LETTER
November 12, 2019      Gene Baker III, MD  [address] [address]           [facility] - Pre Op Consult  [address] [address]  Phone: [phone]          Patient: Delmy Fairbanks   MR Number: [id number]   YOB: 1950   Date of Visit: 11/12/2019       Dear Dr. Gene Baker III:    Thank you for referring Delmy Fairbanks to me for evaluation. Attached you will find relevant portions of my assessment and plan of care.    If you have questions, please do not hesitate to call me. I look forward to following Delmy Fairbanks along with you.    Sincerely,    Janice Lugo MD    Enclosure  CC:  Noman Faith NP    If you would like to receive this communication electronically, please contact [email or web address] or [phone] to request more information on [employer] Link access.    For providers and/or their staff who would like to refer a patient to [facility], please contact us through our one-stop-shop provider referral line, [facility], at [phone].    If you feel you have received this communication in error or would no longer like to receive these types of communications, please e-mail [email or web address]

## 2019-11-12 NOTE — ASSESSMENT & PLAN NOTE
Used HCTZ in the past   Not taking any more   BP without Medication 120/60  Knee pain contributing to BP    Recent BP readings in the [id number]'s  Hypertension-  Blood pressure is acceptable . I suggest continuation of blood pressure  monitoring .I suggest addressing pain control as uncontrolled pain can increased blood pressure

## 2019-11-12 NOTE — ASSESSMENT & PLAN NOTE
Tobacco use-I  Informed about risk of wound healing problem ,infection,lung complications,thrombosis with tobacco use    I suggested to consider stopping  smoking tobacco for its benefits in the georgette operative period and in the long term  Used to do 1-2 cigarettes a day   Down to 1 cigarette in a week     Not known to have COPD, Bronchitis, Emphysema, wheezing , chronic phlegm   No inhaler, oxygen use   Suggested quitting

## 2019-11-12 NOTE — HPI
History of present illness- I had the pleasure of meeting this pleasant 69 y.o. lady in the pre op clinic prior to her elective Orthopedic surgery. The patient is new to me . Delmy SHER was accompanied by daughter in law Delmy  [facility] .    I have obtained the history by speaking to the patient and by reviewing the electronic health records.    Events leading up to surgery / History of presenting illness -    She has been troubled with severe  Rt knee   pain for 2 years  . Pain increases with activity and decreases with resting.    Relevant health conditions of significance for the perioperative period/ History of presenting illness -    Subjectively describes health as good      Lives with family   Help available post op   Single level house       Not known to have CAD , Diabetes Mellitus, Lung disease

## 2019-11-12 NOTE — DISCHARGE INSTRUCTIONS
Your surgery has been scheduled for:__________________________________________    You should report to:  ____[facility], located on the [place] side of the first floor of the           [facility] ([phone])  ____[facility], located on the [facility] side of the            Second floor of the [facility] ([phone])  ____3rd Floor [facility] located on the [facility] side of the [facility] [phone]  ____[facility]: located at [address] [address]. Check in on the first floor of the hospital.  Please Note   - Tell your doctor if you take Aspirin, products containing Aspirin, herbal medications  or blood thinners, such as Coumadin, Ticlid, or Plavix.  (Consult your provider regarding holding or stopping before surgery).  - Arrange for someone to drive you home following surgery.  You will not be allowed to leave the surgical facility alone or drive yourself home following sedation and anesthesia.  Before Surgery  - Stop taking all herbal medications 14 days prior to surgery  - No Motrin/Advil (Ibuprofen)  1 day before surgery  - No Aleve (Naproxen) 7 days before surgery  - Stop Taking Asprin, products containing Asprin _____days before surgery  - Stop taking blood thinners_______days before surgery  - No Goody's/BC  Powder 7 days before surgery  - Refrain from drinking alcoholic beverages for 24hours before and after surgery  - Stop or limit smoking _________days before surgery  - You may take Tylenol for pain  Night before Surgery  - Do not eat or drink after midnight  - Take a shower or bath (shower is recommended).  Bathe with Hibiclens soap or an antibacterial soap from the neck down.  If not supplied by your surgeon, hibiclens soap will need to be purchased over the counter in pharmacy.  Rinse soap off thoroughly.  - Shampoo your hair with your regular  shampoo  The Day of Surgery  - Take another bath or shower with hibiclens or any antibacterial soap, to reduce the chance of infection.  - Take heart and blood pressure medications with a small sip of water, as advised by the perioperative team.  - Do not take fluid pills  - You may brush your teeth and rinse your mouth, but do not swall any additional water.   - Do not apply perfumes, powder, body lotions or deodorant on the day of surgery.  - Nail polish should be removed.  - Do not wear makeup or moisturizer  - Wear comfortable clothes, such as a button front shirt and loose fitting pants.  - Leave all jewelry, including body piercings, and valuables at home.    - Bring any devices you will neeed after surgery such as crutches or canes.  - If you have sleep apnea, please bring your CPAP machine  In the event that your physical condition changes including the onset of a cold or respiratory illness, or if you have to delay or cancel your surgery, please notify your surgeon.

## 2019-11-12 NOTE — OUTPATIENT SUBJECTIVE & OBJECTIVE
Outpatient Subjective & Objective     Chief complaint-Preoperative evaluation, Perioperative Medical management, complication reduction plan     Active cardiac conditions- none    Revised cardiac risk index predictors- none    Functional capacity -Examples of physical activity , house work, walks, house work and can take a flight of stairs holding on to the railing----- She can undertake all the above activities without  chest pain,chest tightness, Shortness of breath ,dizziness,lightheadedness making her exercise tolerance more,   than 4 Mets.       Review of Systems   Constitutional: Negative for chills and fever.        No unusual weight changes       HENT:        VERONICAG score  1/ 8    Age over 50        Eyes:        No new visual changes   Respiratory:        No cough , phlegm  No Hemoptysis   Cardiovascular:        As noted   Gastrointestinal:        No overt GI/ blood losses  Bowel movements- Regular    Endocrine:        Prednisone use > 20 mg daily for 3 weeks- None   Genitourinary: Negative for dysuria.        No urinary hesitancy    Musculoskeletal:        As above   Rt > Left knee pain    Skin: Negative for rash.   Neurological: Negative for syncope.        No unilateral weakness   Hematological:        Current use of Anticoagulants  None    Psychiatric/Behavioral:        No Depression,Anxiety     no vascular stenting     No past medical history pertinent negatives.  No family history on file.  No past surgical history on file.    No anesthesia, bleeding, cardiac problems, PONV with previous surgeries/procedures.  Medications and Allergies reviewed in epic.  FH- No anesthesia,bleeding / venous thrombosis , early onset heart disease in family      Physical Exam      Physical Exam  Constitutional- General appearance-Conscious,Coherent  Eyes- No conjunctival icterus,pupils  round  and reactive to light   ENT-Oral cavity- moist  , Hearing grossly normal   Neck- No thyromegaly ,Trachea -central, No jugular  venous distension,   No Carotid Bruit   Cardiovascular -Heart Sounds- Normal  and  no murmur   , No gallop rhythm   Respiratory - Normal Respiratory Effort, Normal breath sounds,  no wheeze  and  no forced expiratory wheeze    Peripheral pitting pedal edema-- mild, no calf pain   Gastrointestinal -Soft abdomen, No palpable masses, Non Tender,Liver,Spleen not palpable. No-- free fluid and shifting dullness  Musculoskeletal- No finger Clubbing. Strength grossly normal   Lymphatic-No Palpable cervical, axillary,Inguinal lymphadenopathy   Psychiatric - normal effect,Orientation  Rt Dorsalis pedis pulses-palpable    Lt Dorsalis pedis pulses- palpable   Rt Posterior tibial pulses -palpable   Left posterior tibial pulses -palpable   Miscellaneous - lesion Rt back- no change in a long time - suggested watching  and  no renal bruit  Investigations  Lab and Imaging have been reviewed in epic.    Review of Medicine tests    Sept 2019     · Normal right ventricular systolic function.  · Grade II (moderate) left ventricular diastolic dysfunction consistent with pseudonormalization.  · Normal left ventricular systolic function. The estimated ejection fraction is 55%  · The estimated PA systolic pressure is 20 mm Hg  · Normal central venous pressure (3 mm Hg).    EKG- I had independently reviewed the EKG from--9/16/2019   It was reported to be showing     Normal sinus rhythm with sinus arrhythmia  Nonspecific ST and T wave abnormality  Abnormal ECG    9/30/2019     No evidence of deep vein thrombosis in either lower extremity.    Bilateral venous reflux in the great saphenous vein at the level of the knee, as above.    Review of clinical lab tests:  Lab Results   Component Value Date    HGB 12.6 11/12/2019     11/12/2019         Review of old records- Was done and information gathered regards to events leading to surgery and health conditions of significance in the perioperative period.    Outpatient Subjective & Objective

## 2019-11-12 NOTE — PROGRESS NOTES
[facility] - Pre Op Consult  Progress Note    Patient Name: Delmy Fairbanks  MRN: [id number]  Date of Evaluation- 11/12/2019  PCP- Primary Doctor No    Future cases for Demly Strong [[id number]]     Case ID Status Date Time Osmin Procedure Provider Location    [id number] [facility] 11/18/2019  4:10  ARTHROPLASTY, KNEE, TOTAL-DEPUY-SIGMA/DEPUY-TC3/MBT back up Gene Baker III, MD [[id number]] [facility] OR      Rt     HPI:  History of present illness- I had the pleasure of meeting this pleasant 69 y.o. lady in the pre op clinic prior to her elective Orthopedic surgery. The patient is new to me . Delmy SHER was accompanied by daughter in law Delmy  Used [facility] interpretor for evaluation .    I have obtained the history by speaking to the patient and by reviewing the electronic health records.    Events leading up to surgery / History of presenting illness -    She has been troubled with severe  Rt knee   pain for 2 years  . Pain increases with activity and decreases with resting.    Relevant health conditions of significance for the perioperative period/ History of presenting illness -    Subjectively describes health as good      Lives with family   Help available post op   Single level house       Not known to have CAD , Diabetes Mellitus, Lung disease         Subjective/ Objective:       Chief complaint-Preoperative evaluation, Perioperative Medical management, complication reduction plan     Active cardiac conditions- none    Revised cardiac risk index predictors- none    Functional capacity -Examples of physical activity , house work, walks, house work and can take a flight of stairs holding on to the railing----- She can undertake all the above activities without  chest pain,chest tightness, Shortness of breath ,dizziness,lightheadedness making her exercise tolerance more,   than 4 Mets.       Review of Systems   Constitutional: Negative for chills and fever.        No unusual weight changes       HENT:         STOPBANG score  1/ 8    Age over 50        Eyes:        No new visual changes   Respiratory:        No cough , phlegm  No Hemoptysis   Cardiovascular:        As noted   Gastrointestinal:        No overt GI/ blood losses  Bowel movements- Regular    Endocrine:        Prednisone use > 20 mg daily for 3 weeks- None   Genitourinary: Negative for dysuria.        No urinary hesitancy    Musculoskeletal:        As above   Rt > Left knee pain    Skin: Negative for rash.   Neurological: Negative for syncope.        No unilateral weakness   Hematological:        Current use of Anticoagulants  None    Psychiatric/Behavioral:        No Depression,Anxiety     no vascular stenting     No past medical history pertinent negatives.  No family history on file.  No past surgical history on file.    No anesthesia, bleeding, cardiac problems, PONV with previous surgeries/procedures.  Medications and Allergies reviewed in epic.  FH- No anesthesia,bleeding / venous thrombosis , early onset heart disease in family      Physical Exam      Physical Exam  Constitutional- General appearance-Conscious,Coherent  Eyes- No conjunctival icterus,pupils  round  and reactive to light   ENT-Oral cavity- moist  , Hearing grossly normal   Neck- No thyromegaly ,Trachea -central, No jugular venous distension,   No Carotid Bruit   Cardiovascular -Heart Sounds- Normal  and  no murmur   , No gallop rhythm   Respiratory - Normal Respiratory Effort, Normal breath sounds,  no wheeze  and  no forced expiratory wheeze    Peripheral pitting pedal edema-- mild, no calf pain   Gastrointestinal -Soft abdomen, No palpable masses, Non Tender,Liver,Spleen not palpable. No-- free fluid and shifting dullness  Musculoskeletal- No finger Clubbing. Strength grossly normal   Lymphatic-No Palpable cervical, axillary,Inguinal lymphadenopathy   Psychiatric - normal effect,Orientation  Rt Dorsalis pedis pulses-palpable    Lt Dorsalis pedis pulses- palpable   Rt Posterior tibial  pulses -palpable   Left posterior tibial pulses -palpable   Miscellaneous - lesion Rt back- no change in a long time - suggested watching  and  no renal bruit  Investigations  Lab and Imaging have been reviewed in epic.    Review of Medicine tests    Sept 2019     · Normal right ventricular systolic function.  · Grade II (moderate) left ventricular diastolic dysfunction consistent with pseudonormalization.  · Normal left ventricular systolic function. The estimated ejection fraction is 55%  · The estimated PA systolic pressure is 20 mm Hg  · Normal central venous pressure (3 mm Hg).    EKG- I had independently reviewed the EKG from--9/16/2019   It was reported to be showing     Normal sinus rhythm with sinus arrhythmia  Nonspecific ST and T wave abnormality  Abnormal ECG    9/30/2019     No evidence of deep vein thrombosis in either lower extremity.    Bilateral venous reflux in the great saphenous vein at the level of the knee, as above.    Review of clinical lab tests:  Lab Results   Component Value Date    HGB 12.6 11/12/2019     11/12/2019         Review of old records- Was done and information gathered regards to events leading to surgery and health conditions of significance in the perioperative period.        Preoperative cardiac risk assessment-  The patient does not have any active cardiac conditions . Revised cardiac risk index predictors- 0---.Functional capacity is less and more than 4 Mets. She will be undergoing a Orthopedic procedure that carries a intermediate risk     Risk of a major Cardiac event ( Defined as death, myocardial infarction, or cardiac arrest at 30 days after noncardiac surgery), based on RCRI score     -3.9%         No further cardiac work up is indicated prior to proceeding with the surgery          American Society of Anesthesiologists Physical status classification ( ASA ) class:3     Postoperative pulmonary complication risk assessment:      ARISCAT ( Canet) risk index-  risk class -  Low, if duration of surgery is under 3 hours, intermediate, if duration of surgery is over 3 hours        Assessment/Plan:     Venous insufficiency of both lower extremities  Uses compression stockings  Increased risk of thrombosis in the georgette operative period     Claudication  History does not suggest Typical of Claudication at this time   Vascular US- Sept 2019     The right resting ANALI is normal.   The right ankle [DT] artery has triphasic flow.   The right ankle [DP] artery has triphasic flow.   The left resting ANALI is normal.     Exercise Study: treadmill test.   The exercise study was stopped due to leg fatigue and patient request.     Immediately post exercise, the right ANALI is normal.  Immediately post exercise, the left ANALI is normal.    Diastolic dysfunction  Diastolic Dysfunction:  I  suggest watching her fluid status perioperatively and to watch out for fluid overload in view of her Diastolic Dysfunction.  I suggest avoidance of the ordering of continuous IV fluids and if IV fluids are required ,to order for a specific duration of time and consider ordering lower IV fluid rate     Lower extremity edema  Edema- I suggested avoidance of added salt,avoidance of NSAID's, unless advised or ordered  and suggested Limb elevation and dagmar hose use    Essential hypertension  Used HCTZ in the past   Not taking any more   BP without Medication 120/60  Knee pain contributing to BP    Recent BP readings in the [id number]'s  Hypertension-  Blood pressure is acceptable . I suggest continuation of blood pressure  monitoring .I suggest addressing pain control as uncontrolled pain can increased blood pressure     Tobacco abuse  Tobacco use-I  Informed about risk of wound healing problem ,infection,lung complications,thrombosis with tobacco use    I suggested to consider stopping  smoking tobacco for its benefits in the georgette operative period and in the long term  Used to do 1-2 cigarettes a day   Down  to 1 cigarette in a week     Not known to have COPD, Bronchitis, Emphysema, wheezing , chronic phlegm   No inhaler, oxygen use   Suggested quitting           Preventive perioperative care    Thromboembolic prophylaxis:  Her risk factors for thrombosis include surgical procedure and age.I suggest  thromboembolic prophylaxis ( mechanical/pharmacological, weighing the risk benefits of pharmacological agent use considering georgette procedural bleeding )  during the perioperative period.I suggested being active in the post operative period.   The patient is a candidate for extended DVT prophylaxis     Postoperative pulmonary complication prophylaxis-Risk factors for post operative pulmonary complications include age over 65 years and ASA class >2- I suggest incentive spirometry use, early ambulation and end tidal carbon dioxide monitoring  , oral care , head end of bed elevation      Renal complication prophylaxis- I suggest keeping her well hydrated  in the perioperative period.     Surgical site Infection Prophylaxis-I  suggest appropriate antibiotic for Prophylaxis against Surgical site infections     In view of regional anesthesia  the patient  is at risk of postoperative urinary retention.  I suggest avoidance / minimizing the of  Benzodiazepines,Anticholinergic medication,antihistamines ( Benadryl) , if possible in the perioperative period. I suggest using the minimum possible use of opioids for the minimum period of time in the perioperative period. Benadryl avoidance suggested      This visit was focused on Preoperative evaluation, Perioperative Medical management, complication reduction plans. I suggest that the patient follows up with primary care or relevant sub specialists for ongoing health care.    I appreciate the opportunity to be involved in this patients care. Please feel free to contact me if there were any questions about this consultation.    Patient is optimized     Patient was instructed to call and  "update me about any changes to health,  medication, office visits ,testing out side of the georgette operative care center , hospitalizations between now and surgery     Patient portal- Not enrolled - Encouraged family  to help     Janice Lugo MD  Perioperative Medicine  [facility]   Pager [phone]  --  11/12- 19 37   /78 Comment: Left  Pulse (!) 57   Temp 97.6 °F (36.4 °C)   Ht 5' 4" (1.626 m)   Wt 81.9 kg (180 lb 9.6 oz)   SpO2 97%   BMI 31.00 kg/m²    INR- N  Creatinine - 0.9   -  11/15- 2108     Called to follow up , spoke to son Kalyan ,  to address any concerns with the up coming surgery or any questions on Medication instructions -  Doing good ,No changes to Medication, Health -    "

## 2019-11-12 NOTE — ASSESSMENT & PLAN NOTE
Edema- I suggested avoidance of added salt,avoidance of NSAID's, unless advised or ordered  and suggested Limb elevation and dagmar hose use

## 2019-11-14 RX ORDER — ACETAMINOPHEN 500 MG
1000 TABLET ORAL EVERY 6 HOURS
Status: CANCELLED | OUTPATIENT
Start: 2019-11-14 | End: 2019-11-16

## 2019-11-14 RX ORDER — OXYCODONE HYDROCHLORIDE 5 MG/1
5 TABLET ORAL
Status: CANCELLED | OUTPATIENT
Start: 2019-11-14

## 2019-11-14 RX ORDER — PREGABALIN 25 MG/1
75 CAPSULE ORAL NIGHTLY
Status: CANCELLED | OUTPATIENT
Start: 2019-11-14

## 2019-11-14 RX ORDER — BISACODYL 10 MG
10 SUPPOSITORY, RECTAL RECTAL EVERY 12 HOURS PRN
Status: CANCELLED | OUTPATIENT
Start: 2019-11-14

## 2019-11-14 RX ORDER — MIDAZOLAM HYDROCHLORIDE 1 MG/ML
1 INJECTION INTRAMUSCULAR; INTRAVENOUS EVERY 5 MIN PRN
Status: CANCELLED | OUTPATIENT
Start: 2019-11-14

## 2019-11-14 RX ORDER — FENTANYL CITRATE 50 UG/ML
25 INJECTION, SOLUTION INTRAMUSCULAR; INTRAVENOUS EVERY 5 MIN PRN
Status: CANCELLED | OUTPATIENT
Start: 2019-11-14

## 2019-11-14 RX ORDER — AMOXICILLIN 250 MG
1 CAPSULE ORAL 2 TIMES DAILY
Status: CANCELLED | OUTPATIENT
Start: 2019-11-14

## 2019-11-14 RX ORDER — CELECOXIB 100 MG/1
400 CAPSULE ORAL
Status: CANCELLED | OUTPATIENT
Start: 2019-11-14

## 2019-11-14 RX ORDER — ASPIRIN 81 MG/1
81 TABLET ORAL 2 TIMES DAILY
Status: CANCELLED | OUTPATIENT
Start: 2019-11-14

## 2019-11-14 RX ORDER — MUPIROCIN 20 MG/G
1 OINTMENT TOPICAL
Status: CANCELLED | OUTPATIENT
Start: 2019-11-14

## 2019-11-14 RX ORDER — MUPIROCIN 20 MG/G
1 OINTMENT TOPICAL 2 TIMES DAILY
Status: CANCELLED | OUTPATIENT
Start: 2019-11-14 | End: 2019-11-19

## 2019-11-14 RX ORDER — ROPIVACAINE HYDROCHLORIDE 2 MG/ML
8 INJECTION, SOLUTION EPIDURAL; INFILTRATION; PERINEURAL CONTINUOUS
Status: CANCELLED | OUTPATIENT
Start: 2019-11-14

## 2019-11-14 RX ORDER — OXYCODONE HYDROCHLORIDE 5 MG/1
15 TABLET ORAL
Status: CANCELLED | OUTPATIENT
Start: 2019-11-14

## 2019-11-14 RX ORDER — MORPHINE SULFATE 10 MG/ML
2 INJECTION, SOLUTION INTRAMUSCULAR; INTRAVENOUS
Status: CANCELLED | OUTPATIENT
Start: 2019-11-14

## 2019-11-14 RX ORDER — SODIUM CHLORIDE 0.9 % (FLUSH) 0.9 %
10 SYRINGE (ML) INJECTION
Status: CANCELLED | OUTPATIENT
Start: 2019-11-14

## 2019-11-14 RX ORDER — OXYCODONE HYDROCHLORIDE 5 MG/1
10 TABLET ORAL
Status: CANCELLED | OUTPATIENT
Start: 2019-11-14

## 2019-11-14 RX ORDER — TALC
6 POWDER (GRAM) TOPICAL NIGHTLY PRN
Status: CANCELLED | OUTPATIENT
Start: 2019-11-14

## 2019-11-14 RX ORDER — POLYETHYLENE GLYCOL 3350 17 G/17G
17 POWDER, FOR SOLUTION ORAL DAILY
Status: CANCELLED | OUTPATIENT
Start: 2019-11-14

## 2019-11-14 RX ORDER — SODIUM CHLORIDE 9 MG/ML
INJECTION, SOLUTION INTRAVENOUS CONTINUOUS
Status: CANCELLED | OUTPATIENT
Start: 2019-11-14 | End: 2019-11-15

## 2019-11-14 RX ORDER — FAMOTIDINE 20 MG/1
20 TABLET, FILM COATED ORAL 2 TIMES DAILY
Status: CANCELLED | OUTPATIENT
Start: 2019-11-14

## 2019-11-14 RX ORDER — ONDANSETRON 2 MG/ML
4 INJECTION INTRAMUSCULAR; INTRAVENOUS EVERY 8 HOURS PRN
Status: CANCELLED | OUTPATIENT
Start: 2019-11-14

## 2019-11-14 RX ORDER — PREGABALIN 25 MG/1
75 CAPSULE ORAL
Status: CANCELLED | OUTPATIENT
Start: 2019-11-14

## 2019-11-14 RX ORDER — CELECOXIB 100 MG/1
200 CAPSULE ORAL DAILY
Status: CANCELLED | OUTPATIENT
Start: 2019-11-14

## 2019-11-14 RX ORDER — LIDOCAINE HYDROCHLORIDE 10 MG/ML
1 INJECTION, SOLUTION EPIDURAL; INFILTRATION; INTRACAUDAL; PERINEURAL
Status: CANCELLED | OUTPATIENT
Start: 2019-11-14

## 2019-11-14 RX ORDER — NALOXONE HCL 0.4 MG/ML
0.02 VIAL (ML) INJECTION
Status: CANCELLED | OUTPATIENT
Start: 2019-11-14

## 2019-11-14 RX ORDER — SODIUM CHLORIDE 9 MG/ML
INJECTION, SOLUTION INTRAVENOUS
Status: CANCELLED | OUTPATIENT
Start: 2019-11-14

## 2019-11-14 NOTE — PROGRESS NOTES
Delmy Fairbanks is a 69 y.o. year old here today for a pre-operative visit in preparation for a Right total knee arthroplasty to be performed by  Dr. Baker on 11/18/19.  she was last seen and treated in the clinic on 10/31/2019. she will be medically optimized by the pre op center. There has been no significant change in medical status since last visit. No fever, chills, malaise, or unexplained weight change.      Allergies, Medications, past medical and surgical history reviewed.    Focused examination performed.    Patient declined to see Dr. Baker today in clinic. All questions answered. Patient encouraged to call with questions. Contact information given.     Pre, georgette, and post operative procedures and expectations discussed. Questions were answered. Delmy Fairbanks has been educated and is ready to proceed with surgery. Approximately 30 minutes was spent discussing surgical outcomes, plans, procedures pre, georgette, and post operative expections and care.  Surgical consent signed.    Delmy Fairbanks will contact us if there are any questions, concerns, or changes in medical status prior to surgery.

## 2019-11-14 NOTE — H&P
CC: Right knee pain    Delmy Fairbanks is a 69 y.o. female with a history of Right knee pain. Pain is worse with activity and weight bearing.  Patient has experienced interference of activities of daily living due to decreased range of motion and an increase in joint pain and swelling.  Patient has failed non-operative treatment including NSAIDs, corticosteroid injections, viscosupplement injections, and activity modification.  Delmy Fairbanks currently ambulates independently.     Relevant medical conditions of significance in perioperative period:  HTN- on medication managed by pcp  Tobacco abuse- quit one month ago  Claudication- managed by pcp    Past Medical History:   Diagnosis Date    Arthritis     Hypertension        Past Surgical History:   Procedure Laterality Date    COLONOSCOPY  01/2019       History reviewed. No pertinent family history.    Review of patient's allergies indicates:  No Known Allergies      Current Outpatient Medications:     acetaminophen (TYLENOL) 500 MG tablet, Take 500 mg by mouth as needed for Pain., Disp: , Rfl:     chlorthalidone (HYGROTEN) 25 MG Tab, Take 25 mg by mouth once daily., Disp: , Rfl:     ergocalciferol (VITAMIN D2) 50,000 unit Cap, Take 50,000 Units by mouth every 7 days. Wednesdays, Disp: , Rfl:     furosemide (LASIX) 20 MG tablet, Take 1 tablet (20 mg total) by mouth daily as needed (For Lower extremity  swelling.). (Patient taking differently: Take 20 mg by mouth once daily. ), Disp: 30 tablet, Rfl: 11    naproxen (NAPROSYN) 500 MG tablet, Take 1 tablet (500 mg total) by mouth 2 (two) times daily with meals., Disp: 60 tablet, Rfl: 0    Review of Systems:  Constitutional: no fever or chills  Eyes: no visual changes  ENT: no nasal congestion or sore throat  Respiratory: no cough or shortness of breath  Cardiovascular: no chest pain or palpitations  Gastrointestinal: no nausea or vomiting, tolerating diet  Genitourinary: no hematuria or  dysuria  Integument/Breast: no rash or pruritis  Hematologic/Lymphatic: no easy bruising or lymphadenopathy  Musculoskeletal: positive for c/o right knee pain worse with increased activity  Neurological: no seizures or tremors  Behavioral/Psych: no auditory or visual hallucinations  Endocrine: no heat or cold intolerance    PE:  There were no vitals taken for this visit.  General: Pleasant, cooperative, NAD   Gait: antalgic  HEENT: NCAT, sclera nonicteric   Lungs: Respirations clear bilaterally; equal and unlabored.   CV: S1S2; 2+ bilateral upper and lower extremity pulses.   Skin: Intact throughout with no rashes, erythema, or lesions  Extremities: No LE edema,  no erythema or warmth of the skin in either lower extremity.    Right knee exam:  Knee Range of Motion:normal, pain with passive range of motion  Effusion:none  Condition of skin:intact  Location of tenderness:Medial joint line   Strength:normal  Stability: stable to testing    Hip Examination:normal    Radiographs: Radiographs reveal no fracture dislocation bone destruction or OCD seen.  There is moderate-severe DJD and a valgus deformity     Knee Alignment: moderate valgus    Diagnosis: osteoarthritis Right knee    Plan: Right total knee arthroplasty    Due to the serious nature of total joint infection and the high prevalence of community acquired MRSA, vancomycin will be used perioperatively.

## 2019-11-15 ENCOUNTER — TELEPHONE (OUTPATIENT)
Dept: ORTHOPEDICS | Facility: CLINIC | Age: 69
End: 2019-11-15

## 2019-11-15 DIAGNOSIS — Z96.651 S/P TOTAL KNEE REPLACEMENT USING CEMENT, RIGHT: Primary | ICD-10-CM

## 2019-11-15 RX ORDER — DOCUSATE SODIUM 100 MG/1
100 CAPSULE, LIQUID FILLED ORAL 2 TIMES DAILY PRN
Qty: 60 CAPSULE | Refills: 0 | Status: SHIPPED | OUTPATIENT
Start: 2019-11-15

## 2019-11-15 RX ORDER — DEXTROMETHORPHAN HYDROBROMIDE, GUAIFENESIN 5; 100 MG/5ML; MG/5ML
650 LIQUID ORAL EVERY 8 HOURS PRN
Qty: 120 TABLET | Refills: 0 | Status: SHIPPED | OUTPATIENT
Start: 2019-11-15

## 2019-11-15 RX ORDER — ASPIRIN 81 MG/1
81 TABLET ORAL 2 TIMES DAILY
Qty: 60 TABLET | Refills: 0 | Status: SHIPPED | OUTPATIENT
Start: 2019-11-15 | End: 2019-12-15

## 2019-11-15 RX ORDER — CELECOXIB 200 MG/1
200 CAPSULE ORAL DAILY
Qty: 30 CAPSULE | Refills: 0 | Status: SHIPPED | OUTPATIENT
Start: 2019-11-15 | End: 2019-12-15

## 2019-11-15 RX ORDER — OXYCODONE HYDROCHLORIDE 5 MG/1
TABLET ORAL
Qty: 50 TABLET | Refills: 0 | Status: SHIPPED | OUTPATIENT
Start: 2019-11-15 | End: 2019-12-30 | Stop reason: ALTCHOICE

## 2019-11-15 NOTE — PROGRESS NOTES
Called international services, spoke with an  and pt on 3 way. Informed pt of arrival time for surgery on 11/18 12 noon 1st [facility]. No food or drink after midnight.  translated information and got the verbal understanding from pt.

## 2019-11-18 ENCOUNTER — ANESTHESIA (OUTPATIENT)
Dept: SURGERY | Facility: HOSPITAL | Age: 69
End: 2019-11-18
Payer: MEDICARE

## 2019-11-18 ENCOUNTER — HOSPITAL ENCOUNTER (OUTPATIENT)
Facility: HOSPITAL | Age: 69
Discharge: HOME | End: 2019-11-19
Attending: ORTHOPAEDIC SURGERY | Admitting: ORTHOPAEDIC SURGERY
Payer: MEDICARE

## 2019-11-18 DIAGNOSIS — Z96.651 S/P TOTAL KNEE REPLACEMENT USING CEMENT, RIGHT: Primary | ICD-10-CM

## 2019-11-18 DIAGNOSIS — Z96.651 S/P TOTAL KNEE REPLACEMENT USING CEMENT, RIGHT: ICD-10-CM

## 2019-11-18 DIAGNOSIS — M17.11 PRIMARY OSTEOARTHRITIS OF RIGHT KNEE: ICD-10-CM

## 2019-11-18 PROCEDURE — 37000008 HC ANESTHESIA 1ST 15 MINUTES: Performed by: ORTHOPAEDIC SURGERY

## 2019-11-18 PROCEDURE — 27447 PR TOTAL KNEE ARTHROPLASTY: ICD-10-PCS | Mod: RT,,, | Performed by: ORTHOPAEDIC SURGERY

## 2019-11-18 PROCEDURE — 25000003 PHARM REV CODE 250: Performed by: NURSE ANESTHETIST, CERTIFIED REGISTERED

## 2019-11-18 PROCEDURE — 94799 UNLISTED PULMONARY SVC/PX: CPT

## 2019-11-18 PROCEDURE — 36000711: Performed by: ORTHOPAEDIC SURGERY

## 2019-11-18 PROCEDURE — C1751 CATH, INF, PER/CENT/MIDLINE: HCPCS

## 2019-11-18 PROCEDURE — 94770 HC EXHALED C02 TEST: CPT | Mod: 59

## 2019-11-18 PROCEDURE — 71000033 HC RECOVERY, INTIAL HOUR: Performed by: ORTHOPAEDIC SURGERY

## 2019-11-18 PROCEDURE — 64448 NJX AA&/STRD FEM NRV NFS IMG: CPT | Performed by: ANESTHESIOLOGY

## 2019-11-18 PROCEDURE — 64448 PR NERVE BLOCK INJ, ANES/STEROID, FEMORAL, CONT INFUSION, INCL IMAG GUIDANCE: ICD-10-PCS | Mod: 59,RT,, | Performed by: ANESTHESIOLOGY

## 2019-11-18 PROCEDURE — 27201423 OPTIME MED/SURG SUP & DEVICES STERILE SUPPLY: Performed by: ORTHOPAEDIC SURGERY

## 2019-11-18 PROCEDURE — 63600175 PHARM REV CODE 636 W HCPCS: Performed by: NURSE ANESTHETIST, CERTIFIED REGISTERED

## 2019-11-18 PROCEDURE — 76942 PR U/S GUIDANCE FOR NEEDLE GUIDANCE: ICD-10-PCS | Mod: 26,,, | Performed by: ANESTHESIOLOGY

## 2019-11-18 PROCEDURE — 25000003 PHARM REV CODE 250: Performed by: NURSE PRACTITIONER

## 2019-11-18 PROCEDURE — C1713 ANCHOR/SCREW BN/BN,TIS/BN: HCPCS | Performed by: ORTHOPAEDIC SURGERY

## 2019-11-18 PROCEDURE — 37000009 HC ANESTHESIA EA ADD 15 MINS: Performed by: ORTHOPAEDIC SURGERY

## 2019-11-18 PROCEDURE — 36000710: Performed by: ORTHOPAEDIC SURGERY

## 2019-11-18 PROCEDURE — D9220A PRA ANESTHESIA: Mod: ,,, | Performed by: ANESTHESIOLOGY

## 2019-11-18 PROCEDURE — 27447 TOTAL KNEE ARTHROPLASTY: CPT | Mod: RT,,, | Performed by: ORTHOPAEDIC SURGERY

## 2019-11-18 PROCEDURE — 94761 N-INVAS EAR/PLS OXIMETRY MLT: CPT

## 2019-11-18 PROCEDURE — 64448 NJX AA&/STRD FEM NRV NFS IMG: CPT | Mod: 59,RT,, | Performed by: ANESTHESIOLOGY

## 2019-11-18 PROCEDURE — 76942 ECHO GUIDE FOR BIOPSY: CPT | Performed by: ANESTHESIOLOGY

## 2019-11-18 PROCEDURE — C1776 JOINT DEVICE (IMPLANTABLE): HCPCS | Performed by: ORTHOPAEDIC SURGERY

## 2019-11-18 PROCEDURE — 27100025 HC TUBING, SET FLUID WARMER: Performed by: NURSE ANESTHETIST, CERTIFIED REGISTERED

## 2019-11-18 PROCEDURE — 63600175 PHARM REV CODE 636 W HCPCS: Performed by: NURSE PRACTITIONER

## 2019-11-18 PROCEDURE — D9220A PRA ANESTHESIA: ICD-10-PCS | Mod: ,,, | Performed by: ANESTHESIOLOGY

## 2019-11-18 PROCEDURE — 25000003 PHARM REV CODE 250: Performed by: ANESTHESIOLOGY

## 2019-11-18 PROCEDURE — 71000039 HC RECOVERY, EACH ADD'L HOUR: Performed by: ORTHOPAEDIC SURGERY

## 2019-11-18 PROCEDURE — 76942 ECHO GUIDE FOR BIOPSY: CPT | Mod: 26,,, | Performed by: ANESTHESIOLOGY

## 2019-11-18 PROCEDURE — 63600175 PHARM REV CODE 636 W HCPCS: Performed by: ANESTHESIOLOGY

## 2019-11-18 PROCEDURE — 99900035 HC TECH TIME PER 15 MIN (STAT)

## 2019-11-18 DEVICE — COMPONENT SIGMA FEM PS SZ2.5 R: Type: IMPLANTABLE DEVICE | Site: KNEE | Status: FUNCTIONAL

## 2019-11-18 DEVICE — INSERT TIBIAL SZ 2.5 10MM PFC: Type: IMPLANTABLE DEVICE | Site: KNEE | Status: FUNCTIONAL

## 2019-11-18 DEVICE — CEMENT BONE WHOLE BATCH: Type: IMPLANTABLE DEVICE | Site: KNEE | Status: FUNCTIONAL

## 2019-11-18 DEVICE — PATELLA OVAL DOME 35MM: Type: IMPLANTABLE DEVICE | Site: KNEE | Status: FUNCTIONAL

## 2019-11-18 DEVICE — TRAY TIBIAL CEMENTED 2.5 COBAL: Type: IMPLANTABLE DEVICE | Site: KNEE | Status: FUNCTIONAL

## 2019-11-18 RX ORDER — MORPHINE SULFATE 2 MG/ML
2 INJECTION, SOLUTION INTRAMUSCULAR; INTRAVENOUS
Status: DISCONTINUED | OUTPATIENT
Start: 2019-11-18 | End: 2019-11-18

## 2019-11-18 RX ORDER — FAMOTIDINE 20 MG/1
20 TABLET, FILM COATED ORAL 2 TIMES DAILY
Status: DISCONTINUED | OUTPATIENT
Start: 2019-11-18 | End: 2019-11-19 | Stop reason: HOSPADM

## 2019-11-18 RX ORDER — TRANEXAMIC ACID 100 MG/ML
INJECTION, SOLUTION INTRAVENOUS
Status: DISCONTINUED | OUTPATIENT
Start: 2019-11-18 | End: 2019-11-18

## 2019-11-18 RX ORDER — SODIUM CHLORIDE 9 MG/ML
INJECTION, SOLUTION INTRAVENOUS
Status: COMPLETED | OUTPATIENT
Start: 2019-11-18 | End: 2019-11-18

## 2019-11-18 RX ORDER — MORPHINE SULFATE 2 MG/ML
2 INJECTION, SOLUTION INTRAMUSCULAR; INTRAVENOUS
Status: DISCONTINUED | OUTPATIENT
Start: 2019-11-18 | End: 2019-11-19 | Stop reason: HOSPADM

## 2019-11-18 RX ORDER — ASPIRIN 81 MG/1
81 TABLET ORAL 2 TIMES DAILY
Status: DISCONTINUED | OUTPATIENT
Start: 2019-11-18 | End: 2019-11-19 | Stop reason: HOSPADM

## 2019-11-18 RX ORDER — OXYCODONE HYDROCHLORIDE 5 MG/1
15 TABLET ORAL
Status: DISCONTINUED | OUTPATIENT
Start: 2019-11-18 | End: 2019-11-18

## 2019-11-18 RX ORDER — SODIUM CHLORIDE 9 MG/ML
INJECTION, SOLUTION INTRAVENOUS CONTINUOUS PRN
Status: DISCONTINUED | OUTPATIENT
Start: 2019-11-18 | End: 2019-11-18

## 2019-11-18 RX ORDER — OXYCODONE HYDROCHLORIDE 10 MG/1
10 TABLET ORAL
Status: DISCONTINUED | OUTPATIENT
Start: 2019-11-18 | End: 2019-11-19 | Stop reason: HOSPADM

## 2019-11-18 RX ORDER — MIDAZOLAM HYDROCHLORIDE 1 MG/ML
1 INJECTION INTRAMUSCULAR; INTRAVENOUS EVERY 5 MIN PRN
Status: DISCONTINUED | OUTPATIENT
Start: 2019-11-18 | End: 2019-11-18 | Stop reason: HOSPADM

## 2019-11-18 RX ORDER — CEFAZOLIN SODIUM 1 G/3ML
2 INJECTION, POWDER, FOR SOLUTION INTRAMUSCULAR; INTRAVENOUS
Status: COMPLETED | OUTPATIENT
Start: 2019-11-18 | End: 2019-11-19

## 2019-11-18 RX ORDER — FENTANYL CITRATE 50 UG/ML
25 INJECTION, SOLUTION INTRAMUSCULAR; INTRAVENOUS EVERY 5 MIN PRN
Status: DISCONTINUED | OUTPATIENT
Start: 2019-11-18 | End: 2019-11-18 | Stop reason: HOSPADM

## 2019-11-18 RX ORDER — CEFAZOLIN SODIUM 1 G/3ML
INJECTION, POWDER, FOR SOLUTION INTRAMUSCULAR; INTRAVENOUS
Status: COMPLETED
Start: 2019-11-18 | End: 2019-11-18

## 2019-11-18 RX ORDER — SODIUM CHLORIDE 0.9 % (FLUSH) 0.9 %
10 SYRINGE (ML) INJECTION
Status: DISCONTINUED | OUTPATIENT
Start: 2019-11-18 | End: 2019-11-18 | Stop reason: HOSPADM

## 2019-11-18 RX ORDER — PREGABALIN 75 MG/1
75 CAPSULE ORAL NIGHTLY
Status: DISCONTINUED | OUTPATIENT
Start: 2019-11-18 | End: 2019-11-19 | Stop reason: HOSPADM

## 2019-11-18 RX ORDER — CEFAZOLIN SODIUM 1 G/3ML
2 INJECTION, POWDER, FOR SOLUTION INTRAMUSCULAR; INTRAVENOUS
Status: DISCONTINUED | OUTPATIENT
Start: 2019-11-18 | End: 2019-11-18

## 2019-11-18 RX ORDER — MIDAZOLAM HYDROCHLORIDE 1 MG/ML
INJECTION, SOLUTION INTRAMUSCULAR; INTRAVENOUS
Status: DISCONTINUED | OUTPATIENT
Start: 2019-11-18 | End: 2019-11-18

## 2019-11-18 RX ORDER — ACETAMINOPHEN 500 MG
1000 TABLET ORAL EVERY 6 HOURS
Status: DISCONTINUED | OUTPATIENT
Start: 2019-11-18 | End: 2019-11-19 | Stop reason: HOSPADM

## 2019-11-18 RX ORDER — MUPIROCIN 20 MG/G
1 OINTMENT TOPICAL 2 TIMES DAILY
Status: DISCONTINUED | OUTPATIENT
Start: 2019-11-18 | End: 2019-11-19 | Stop reason: HOSPADM

## 2019-11-18 RX ORDER — CHLORTHALIDONE 25 MG/1
25 TABLET ORAL DAILY
Status: DISCONTINUED | OUTPATIENT
Start: 2019-11-19 | End: 2019-11-19 | Stop reason: HOSPADM

## 2019-11-18 RX ORDER — FUROSEMIDE 20 MG/1
20 TABLET ORAL DAILY PRN
Status: DISCONTINUED | OUTPATIENT
Start: 2019-11-18 | End: 2019-11-19 | Stop reason: HOSPADM

## 2019-11-18 RX ORDER — CELECOXIB 200 MG/1
400 CAPSULE ORAL
Status: COMPLETED | OUTPATIENT
Start: 2019-11-18 | End: 2019-11-18

## 2019-11-18 RX ORDER — TALC
6 POWDER (GRAM) TOPICAL NIGHTLY PRN
Status: DISCONTINUED | OUTPATIENT
Start: 2019-11-18 | End: 2019-11-18 | Stop reason: HOSPADM

## 2019-11-18 RX ORDER — BISACODYL 10 MG
10 SUPPOSITORY, RECTAL RECTAL EVERY 12 HOURS PRN
Status: DISCONTINUED | OUTPATIENT
Start: 2019-11-18 | End: 2019-11-19 | Stop reason: HOSPADM

## 2019-11-18 RX ORDER — KETAMINE HCL IN 0.9 % NACL 50 MG/5 ML
SYRINGE (ML) INTRAVENOUS
Status: DISCONTINUED | OUTPATIENT
Start: 2019-11-18 | End: 2019-11-18

## 2019-11-18 RX ORDER — POLYETHYLENE GLYCOL 3350 17 G/17G
17 POWDER, FOR SOLUTION ORAL DAILY
Status: DISCONTINUED | OUTPATIENT
Start: 2019-11-19 | End: 2019-11-19 | Stop reason: HOSPADM

## 2019-11-18 RX ORDER — NALOXONE HCL 0.4 MG/ML
0.02 VIAL (ML) INJECTION
Status: DISCONTINUED | OUTPATIENT
Start: 2019-11-18 | End: 2019-11-19 | Stop reason: HOSPADM

## 2019-11-18 RX ORDER — OXYCODONE HYDROCHLORIDE 5 MG/1
5 TABLET ORAL
Status: DISCONTINUED | OUTPATIENT
Start: 2019-11-18 | End: 2019-11-19 | Stop reason: HOSPADM

## 2019-11-18 RX ORDER — CEFAZOLIN SODIUM 1 G/3ML
2 INJECTION, POWDER, FOR SOLUTION INTRAMUSCULAR; INTRAVENOUS
Status: COMPLETED | OUTPATIENT
Start: 2019-11-18 | End: 2019-11-18

## 2019-11-18 RX ORDER — ACETAMINOPHEN 500 MG
TABLET ORAL
Status: DISPENSED
Start: 2019-11-18 | End: 2019-11-19

## 2019-11-18 RX ORDER — LIDOCAINE HCL/PF 100 MG/5ML
SYRINGE (ML) INTRAVENOUS
Status: DISCONTINUED | OUTPATIENT
Start: 2019-11-18 | End: 2019-11-18

## 2019-11-18 RX ORDER — ONDANSETRON 2 MG/ML
4 INJECTION INTRAMUSCULAR; INTRAVENOUS EVERY 8 HOURS PRN
Status: DISCONTINUED | OUTPATIENT
Start: 2019-11-18 | End: 2019-11-19 | Stop reason: HOSPADM

## 2019-11-18 RX ORDER — DIPHENHYDRAMINE HYDROCHLORIDE 50 MG/ML
25 INJECTION INTRAMUSCULAR; INTRAVENOUS EVERY 6 HOURS PRN
Status: DISCONTINUED | OUTPATIENT
Start: 2019-11-18 | End: 2019-11-18 | Stop reason: HOSPADM

## 2019-11-18 RX ORDER — ONDANSETRON 2 MG/ML
INJECTION INTRAMUSCULAR; INTRAVENOUS
Status: DISCONTINUED | OUTPATIENT
Start: 2019-11-18 | End: 2019-11-18

## 2019-11-18 RX ORDER — SODIUM CHLORIDE 9 MG/ML
INJECTION, SOLUTION INTRAVENOUS CONTINUOUS
Status: DISCONTINUED | OUTPATIENT
Start: 2019-11-18 | End: 2019-11-19 | Stop reason: HOSPADM

## 2019-11-18 RX ORDER — AMOXICILLIN 250 MG
1 CAPSULE ORAL 2 TIMES DAILY
Status: DISCONTINUED | OUTPATIENT
Start: 2019-11-18 | End: 2019-11-19 | Stop reason: HOSPADM

## 2019-11-18 RX ORDER — LORAZEPAM 2 MG/ML
0.25 INJECTION INTRAMUSCULAR ONCE AS NEEDED
Status: DISCONTINUED | OUTPATIENT
Start: 2019-11-18 | End: 2019-11-18 | Stop reason: HOSPADM

## 2019-11-18 RX ORDER — ROPIVACAINE HYDROCHLORIDE 2 MG/ML
8 INJECTION, SOLUTION EPIDURAL; INFILTRATION; PERINEURAL CONTINUOUS
Status: DISCONTINUED | OUTPATIENT
Start: 2019-11-18 | End: 2019-11-19 | Stop reason: HOSPADM

## 2019-11-18 RX ORDER — MUPIROCIN 20 MG/G
1 OINTMENT TOPICAL
Status: COMPLETED | OUTPATIENT
Start: 2019-11-18 | End: 2019-11-18

## 2019-11-18 RX ORDER — OXYCODONE HYDROCHLORIDE 5 MG/1
5 TABLET ORAL
Status: DISCONTINUED | OUTPATIENT
Start: 2019-11-18 | End: 2019-11-18

## 2019-11-18 RX ORDER — DEXAMETHASONE SODIUM PHOSPHATE 4 MG/ML
INJECTION, SOLUTION INTRA-ARTICULAR; INTRALESIONAL; INTRAMUSCULAR; INTRAVENOUS; SOFT TISSUE
Status: DISCONTINUED | OUTPATIENT
Start: 2019-11-18 | End: 2019-11-18

## 2019-11-18 RX ORDER — LIDOCAINE HYDROCHLORIDE 10 MG/ML
1 INJECTION, SOLUTION EPIDURAL; INFILTRATION; INTRACAUDAL; PERINEURAL
Status: DISCONTINUED | OUTPATIENT
Start: 2019-11-18 | End: 2019-11-18 | Stop reason: HOSPADM

## 2019-11-18 RX ORDER — PROPOFOL 10 MG/ML
VIAL (ML) INTRAVENOUS CONTINUOUS PRN
Status: DISCONTINUED | OUTPATIENT
Start: 2019-11-18 | End: 2019-11-18

## 2019-11-18 RX ORDER — PREGABALIN 75 MG/1
75 CAPSULE ORAL
Status: COMPLETED | OUTPATIENT
Start: 2019-11-18 | End: 2019-11-18

## 2019-11-18 RX ORDER — OXYCODONE HYDROCHLORIDE 5 MG/1
10 TABLET ORAL
Status: DISCONTINUED | OUTPATIENT
Start: 2019-11-18 | End: 2019-11-18

## 2019-11-18 RX ORDER — ROPIVACAINE HYDROCHLORIDE 2 MG/ML
INJECTION, SOLUTION EPIDURAL; INFILTRATION; PERINEURAL
Status: DISPENSED
Start: 2019-11-18 | End: 2019-11-19

## 2019-11-18 RX ORDER — EPHEDRINE SULFATE 50 MG/ML
INJECTION, SOLUTION INTRAVENOUS
Status: DISCONTINUED | OUTPATIENT
Start: 2019-11-18 | End: 2019-11-18

## 2019-11-18 RX ORDER — OXYCODONE HYDROCHLORIDE 5 MG/1
TABLET ORAL
Status: DISPENSED
Start: 2019-11-18 | End: 2019-11-19

## 2019-11-18 RX ADMIN — TRANEXAMIC ACID 1000 MG: 100 INJECTION, SOLUTION INTRAVENOUS at 02:11

## 2019-11-18 RX ADMIN — CEFAZOLIN 2 G: 330 INJECTION, POWDER, FOR SOLUTION INTRAMUSCULAR; INTRAVENOUS at 02:11

## 2019-11-18 RX ADMIN — FENTANYL CITRATE 25 MCG: 50 INJECTION INTRAMUSCULAR; INTRAVENOUS at 05:11

## 2019-11-18 RX ADMIN — FAMOTIDINE 20 MG: 20 TABLET ORAL at 09:11

## 2019-11-18 RX ADMIN — Medication 10 MG: at 02:11

## 2019-11-18 RX ADMIN — SODIUM CHLORIDE 250 ML: 0.9 INJECTION, SOLUTION INTRAVENOUS at 05:11

## 2019-11-18 RX ADMIN — PREGABALIN 75 MG: 75 CAPSULE ORAL at 12:11

## 2019-11-18 RX ADMIN — PREGABALIN 75 MG: 75 CAPSULE ORAL at 09:11

## 2019-11-18 RX ADMIN — SODIUM CHLORIDE: 0.9 INJECTION, SOLUTION INTRAVENOUS at 12:11

## 2019-11-18 RX ADMIN — MUPIROCIN 1 G: 20 OINTMENT TOPICAL at 12:11

## 2019-11-18 RX ADMIN — EPHEDRINE SULFATE 10 MG: 50 INJECTION INTRAVENOUS at 03:11

## 2019-11-18 RX ADMIN — MIDAZOLAM 2 MG: 1 INJECTION INTRAMUSCULAR; INTRAVENOUS at 02:11

## 2019-11-18 RX ADMIN — ASPIRIN 81 MG: 81 TABLET, COATED ORAL at 09:11

## 2019-11-18 RX ADMIN — MUPIROCIN 1 G: 20 OINTMENT TOPICAL at 09:11

## 2019-11-18 RX ADMIN — SODIUM CHLORIDE, SODIUM GLUCONATE, SODIUM ACETATE, POTASSIUM CHLORIDE, MAGNESIUM CHLORIDE, SODIUM PHOSPHATE, DIBASIC, AND POTASSIUM PHOSPHATE: .53; .5; .37; .037; .03; .012; .00082 INJECTION, SOLUTION INTRAVENOUS at 03:11

## 2019-11-18 RX ADMIN — LIDOCAINE HYDROCHLORIDE 60 MG: 20 INJECTION, SOLUTION INTRAVENOUS at 02:11

## 2019-11-18 RX ADMIN — CELECOXIB 400 MG: 200 CAPSULE ORAL at 12:11

## 2019-11-18 RX ADMIN — SENNOSIDES, DOCUSATE SODIUM 1 TABLET: 50; 8.6 TABLET, FILM COATED ORAL at 09:11

## 2019-11-18 RX ADMIN — SODIUM CHLORIDE: 0.9 INJECTION, SOLUTION INTRAVENOUS at 01:11

## 2019-11-18 RX ADMIN — FENTANYL CITRATE 100 MCG: 50 INJECTION INTRAMUSCULAR; INTRAVENOUS at 01:11

## 2019-11-18 RX ADMIN — PROPOFOL 25 MCG/KG/MIN: 10 INJECTION, EMULSION INTRAVENOUS at 02:11

## 2019-11-18 RX ADMIN — VANCOMYCIN HYDROCHLORIDE 1500 MG: 1.5 INJECTION, POWDER, LYOPHILIZED, FOR SOLUTION INTRAVENOUS at 12:11

## 2019-11-18 RX ADMIN — Medication 10 MG: at 03:11

## 2019-11-18 RX ADMIN — ONDANSETRON 4 MG: 2 INJECTION INTRAMUSCULAR; INTRAVENOUS at 03:11

## 2019-11-18 RX ADMIN — DEXAMETHASONE SODIUM PHOSPHATE 8 MG: 4 INJECTION, SOLUTION INTRAMUSCULAR; INTRAVENOUS at 02:11

## 2019-11-18 RX ADMIN — SODIUM CHLORIDE: 0.9 INJECTION, SOLUTION INTRAVENOUS at 05:11

## 2019-11-18 RX ADMIN — OXYCODONE HYDROCHLORIDE 10 MG: 5 TABLET ORAL at 05:11

## 2019-11-18 RX ADMIN — ACETAMINOPHEN 1000 MG: 500 TABLET ORAL at 05:11

## 2019-11-18 RX ADMIN — ROPIVACAINE HYDROCHLORIDE 8 ML/HR: 2 INJECTION, SOLUTION EPIDURAL; INFILTRATION at 05:11

## 2019-11-18 RX ADMIN — MIDAZOLAM HYDROCHLORIDE 1 MG: 1 INJECTION, SOLUTION INTRAMUSCULAR; INTRAVENOUS at 01:11

## 2019-11-18 NOTE — ASSESSMENT & PLAN NOTE
Delmy Fairbanks is a 69 y.o. female is s/p R TKA on 11/18/19    Surgical dressing C/D/I, dagmar hose and polar ice in pace  Pain control: multimodal, Anesthesia Surgical Home following  PT/OT: WBAT RLE  DVT PPx: ASA 81 BID, FCDs at all times when not ambulating  Abx: postop Ancef  Drain: none  Agee: none    Dispo: f/u PT reccs, plan for d/c home with

## 2019-11-18 NOTE — TRANSFER OF CARE
Anesthesia Transfer of Care Note    Patient: Delmy Fairbanks    Procedure(s) Performed: Procedure(s) (LRB):  ARTHROPLASTY, KNEE, TOTAL-DEPUY-SIGMA/DEPUY-TC3/MBT back up (Right)    Patient location: PACU    Anesthesia Type: spinal    Transport from OR: Transported from OR on room air with adequate spontaneous ventilation. Transported from OR on 6-10 L/min O2 by face mask with adequate spontaneous ventilation    Post pain: adequate analgesia    Post assessment: no apparent anesthetic complications and tolerated procedure well    Post vital signs: stable    Level of consciousness: awake and alert    Nausea/Vomiting: no nausea/vomiting    Complications: none    Transfer of care protocol was followed      Last vitals:   Visit Vitals  /61 (BP Location: Right arm, Patient Position: Lying)   Pulse (!) 58   Temp 36.8 °C (98.3 °F) (Oral)   Resp 15   Wt 81.6 kg (180 lb)   SpO2 100%   Breastfeeding? No   BMI 30.90 kg/m²

## 2019-11-18 NOTE — ANESTHESIA PROCEDURE NOTES
Spinal    Diagnosis: Right knee osteoarthritis  Patient location during procedure: OR  Start time: 11/18/2019 2:39 PM  Timeout: 11/18/2019 2:39 PM  End time: 11/18/2019 2:46 PM    Staffing  Authorizing Provider: Misha Cruz MD  Performing Provider: Misha Cruz MD    Preanesthetic Checklist  Completed: patient identified, site marked, surgical consent, pre-op evaluation, timeout performed, IV checked, risks and benefits discussed and monitors and equipment checked  Spinal Block  Patient position: sitting  Prep: ChloraPrep  Patient monitoring: continuous pulse ox and frequent blood pressure checks  Approach: midline  Location: L3-4  Injection technique: single shot  CSF Fluid: clear free-flowing CSF  Needle  Needle type: Jose   Needle gauge: 25 G  Needle length: 4 in  Additional Documentation: incremental injection, negative aspiration for heme and no paresthesia on injection  Needle localization: anatomical landmarks  Assessment  Sensory level: T6   Dermatomal levels determined by alcohol wipe  Ease of block: easy  Patient's tolerance of the procedure: comfortable throughout block and no complaints  Additional Notes  1446 3ml 1.5% mepivacaine

## 2019-11-18 NOTE — PLAN OF CARE
[facility]    HOME HEALTH ORDERS  FACE TO FACE ENCOUNTER    Patient Name: Delmy Fairbanks  YOB: 1950    PCP: Primary Doctor No   PCP Address: None  PCP Phone Number: None  PCP Fax: None    Encounter Date: 11/18/2019    Admit to Home Health    Diagnoses:  Active Hospital Problems    Diagnosis  POA    *S/P total knee replacement using cement, right 11/18/2019 [Z96.651]  Not Applicable      Resolved Hospital Problems   No resolved problems to display.       Future Appointments   Date Time Provider Department Center   11/20/2019  1:00 PM Yimi Garcia, PT [facility]   12/2/2019  1:00 PM Melisa Encarnacion, NP [facility]           I have seen and examined this patient face to face today. My clinical findings that support the need for the home health skilled services and home bound status are the following:  Weakness/numbness causing balance and gait disturbance due to Joint Replacement making it taxing to leave home.    Allergies:Review of patient's allergies indicates:  No Known Allergies    Diet: regular diet    Activities: activity as tolerated    Nursing:   SN to complete comprehensive assessment including routine vital signs. Instruct on disease process and s/s of complications to report to MD. Follow specific home health arthoplasty protocol. Review/verify medication list sent home with the patient at time of discharge  and instruct patient/caregiver as needed. If coumadin ordered, coumadin clinic to manage INR with INR draws 2x per week with a goal to maintain INR between 1.8 and 2.2. Frequency may be adjusted depending on start of care date.    Notify MD if SBP > 160 or < 90; DBP > 90 or < 50; HR > 120 or < 50; Temp > 101    Home Medical Equipment:  Walker, 3-1 bedside commode, transfer tub bench    CONSULTS:    Physical Therapy to evaluate and treat. Evaluate for home safety and equipment needs; Establish/upgrade home exercise program. Perform /  instruct on therapeutic exercises, gait training, transfer training, and Range of Motion.    OTHER:  Occupational Therapy to evaluate and treat. Evaluate home environment for safety and equipment needs. Perform/Instruct on transfers, ADL training, ROM, and therapeutic exercises.      WOUND CARE ORDERS  Do not remove surgical dressing for 2 weeks post-op. This will be done only by MD at initial post-op visit.    Patient may shower at home. Dry area around surgical dressing well afterward. No submerging underwater (bathing, swimming, hot tub) for 1 month.     If dressing becomes saturated with drainage or there are signs of infection, please call [facility] [phone] for further instructions and to make appt to be seen.           Medications: Review discharge medications with patient and family and provide education.      Current Discharge Medication List      CONTINUE these medications which have NOT CHANGED    Details   acetaminophen (TYLENOL) 500 MG tablet Take 500 mg by mouth as needed for Pain.      acetaminophen (TYLENOL) 650 MG TbSR Take 1 tablet (650 mg total) by mouth every 8 (eight) hours as needed.  Qty: 120 tablet, Refills: 0    Associated Diagnoses: S/P total knee replacement using cement, right      aspirin (ECOTRIN) 81 MG EC tablet Take 1 tablet (81 mg total) by mouth 2 (two) times daily.  Qty: 60 tablet, Refills: 0    Associated Diagnoses: S/P total knee replacement using cement, right      celecoxib (CELEBREX) 200 MG capsule Take 1 capsule (200 mg total) by mouth once daily.  Qty: 30 capsule, Refills: 0    Associated Diagnoses: S/P total knee replacement using cement, right      chlorthalidone (HYGROTEN) 25 MG Tab Take 25 mg by mouth once daily.      docusate sodium (COLACE) 100 MG capsule Take 1 capsule (100 mg total) by mouth 2 (two) times daily as needed for Constipation.  Qty: 60 capsule, Refills: 0    Associated Diagnoses: S/P total knee replacement using cement, right      ergocalciferol  (VITAMIN D2) 50,000 unit Cap Take 50,000 Units by mouth every 7 days. Wednesdays      furosemide (LASIX) 20 MG tablet Take 1 tablet (20 mg total) by mouth daily as needed (For Lower extremity  swelling.).  Qty: 30 tablet, Refills: 11    Associated Diagnoses: Lower extremity edema      naproxen (NAPROSYN) 500 MG tablet Take 1 tablet (500 mg total) by mouth 2 (two) times daily with meals.  Qty: 60 tablet, Refills: 0    Associated Diagnoses: Primary osteoarthritis of both knees      oxyCODONE (ROXICODONE) 5 MG immediate release tablet Take 1-2 tabs by mouth every 4-6 hours as needed for pain  Qty: 50 tablet, Refills: 0    Comments: Greater than 7 day supply is medically necessary. Deliver all meds to [facility] for surgery 11/18.  Associated Diagnoses: S/P total knee replacement using cement, right             I certify that this patient is confined to her home and needs physical therapy and occupational therapy.

## 2019-11-18 NOTE — HOSPITAL COURSE
On 11/18/19, the patient arrived to the [facility] for proper pre-operative management.  Upon completion of pre-operative preparation, the patient was taken back to the operative theatre.  A right total knee arthroplasty was performed without complication and the patient was transported to the post anesthesia care unit in stable condition.  After appropriate recovery from the anaesthetic agents used during the surgery, the patient was then transported to the hospital inpatient floor.  The interim of the hospital stay from arrival on the floor up to discharge has been uncomplicated. The patient has tolerated regular diet with no nausea or vomiting.  The patient's pain has been controlled using a multimodal approach with the help of the anesthesia pain service. Currently, the patient's pain is well controlled on an oral regimen.  The patient has been voiding without difficulty.  The patient began participation in physical therapy after surgery and has progressed throughout the entire hospital stay.  Currently, the physical therapy team feels that the patient's progress is sufficient to allow the patient to be discharged to home safely.  The patient agrees with this assessment and desires a discharge today.

## 2019-11-18 NOTE — ANESTHESIA PROCEDURE NOTES
Right adductor canal catheter    Patient location during procedure: pre-op   Block not for primary anesthetic.  Reason for block: at surgeon's request and post-op pain management   Post-op Pain Location: Right knee osteoarthritis  Start time: 11/18/2019 1:10 PM  Timeout: 11/18/2019 1:10 PM   End time: 11/18/2019 1:20 PM    Staffing  Authorizing Provider: Misha Cruz MD  Performing Provider: Misha Cruz MD    Preanesthetic Checklist  Completed: patient identified, site marked, surgical consent, pre-op evaluation, timeout performed, IV checked, risks and benefits discussed and monitors and equipment checked  Peripheral Block  Patient position: supine  Prep: ChloraPrep and site prepped and draped  Patient monitoring: heart rate, cardiac monitor, continuous pulse ox, continuous capnometry and frequent blood pressure checks  Block type: adductor canal  Laterality: right  Injection technique: continuous  Needle  Needle type: Tuohy   Needle gauge: 17 G  Needle length: 3.5 in  Needle localization: anatomical landmarks and ultrasound guidance  Catheter type: spring wound  Catheter size: 19 G  Test dose: lidocaine 1.5% with Epi 1-to-200,000 and negative   -ultrasound image captured on disc.  Assessment  Injection assessment: negative aspiration, negative parasthesia and local visualized surrounding nerve  Paresthesia pain: none  Heart rate change: no  Slow fractionated injection: yes  Additional Notes  VSS.  [facility] RN monitoring vitals throughout procedure.  Patient tolerated procedure well.     20ml 0.25% ropivacaine

## 2019-11-18 NOTE — PROGRESS NOTES
Confirmed with patient and patient's family that patient will stay overnight and will need a walker upon discharge.

## 2019-11-18 NOTE — HPI
CC: Right knee pain     Delmy Fairbanks is a 69 y.o. female with a history of Right knee pain. Pain is worse with activity and weight bearing.  Patient has experienced interference of activities of daily living due to decreased range of motion and an increase in joint pain and swelling.  Patient has failed non-operative treatment including NSAIDs, corticosteroid injections, viscosupplement injections, and activity modification.  Delmy Fairbanks currently ambulates independently.      Relevant medical conditions of significance in perioperative period:  HTN- on medication managed by pcp  Tobacco abuse- quit one month ago  Claudication- managed by pcp

## 2019-11-18 NOTE — OP NOTE
[facility]  OPERATIVE NOTE    Date of Operation:   11/18/2019    Providers Performing:   Surgeon(s):  Gene Baker III, MD  Assistant: Vikas Veras MD    Operative Procedure:   Right Total Knee Arthroplasty, CPT 04883    Preoperative Diagnosis:   Right Knee Osteoarthritis, ICD-10 M17.11    Postoperative Diagnosis:   SAME    Components Used:   Depuy  Femur: Sigma PS Size 2.5  Tibia: Sigma fixed bearing PS Size 5.5  Patella: Sigma Oval Dome 35 mm  Tibial Insert: Sigma fixed bearing PS inset size 10 mm  2 packs cement: Simplex    Implant Name Type Inv. Item Serial No.  Lot No. LRB No. Used Action   CEMENT BONE WHOLE BATCH - [id number]  CEMENT BONE WHOLE BATCH  [employer]. [id number] Right 2 Implanted   PATELLA OVAL DOME 35MM - [id number]  PATELLA OVAL DOME 35MM  [employer] [id number] Right 1 Implanted   TRAY TIBIAL CEMENTED 2.5 COBAL - [id number]  TRAY TIBIAL CEMENTED 2.5 COBAL  [employer] [id number] Right 1 Implanted   COMPONENT SIGMA FEM PS SZ2.5 R - [id number]  COMPONENT SIGMA FEM PS SZ2.5 R  [employer] [id number] Right 1 Implanted   INSERT TIBIAL SZ 2.5 10MM PFC - [id number]  INSERT TIBIAL SZ 2.5 10MM PFC  [employer] [id number] Right 1 Implanted       Anesthesia:   Spinal  Adductor canal block with catheter    Estimated Blood Loss:   200 cc    Specimens:   Tissue sent for pathology per protocol    Complications:   None    Indications:   The patient has failed non-operative measures including medications, injections and activity modifications for their knee.  After an explanation of risks and benefits of knee arthroplasty surgery, the patient wishes to proceed with surgery.    Operative Notes:   The patient was greeted in the pre-op holding area.  The patients knee was marked with a surgical marker by the surgeon.  Spinal-Epidural anesthesia was administered by the anesthesia team.  The patient was placed in the supine position on the OR table with all bony prominences padded.  A tourniquet was not  used.  IV antibiotics were administered.  The leg was prepped and draped in the usual sterile fashion.  A timeout was performed and the correct patient, site and procedure were identified.    A midline incision was made with a standard medical parapatellar arthrotomy.  The standard medial capsular release was performed along with the lateral gutter.  The patella was mobilized from the lateral parapatellar ligament and bony osteophytes were removed.  The intercondylar notch was cleared of the ACL/PCL.    The extramedullary tibial alignment guide was placed in the appropriate slope and varus/valgus orientation and the proximal cutting block secured by pins.  Measured resection with a 4mm cut was accounted for from the high side of the plateau, perpendicular to the ankle.  The cut was made with an oscillating saw.  We then obtained access to the femoral canal with the stepped drill.  The intramedullary elsie was placed in 6 degrees of valgus with a 9mm planned resection.  Our distal femoral cuts were made.  The knee was placed in extension and the medical and lateral meniscal remnants removed.  A spacer block was placed with the knee in extension checking for alignment and balance which we were happy with.    The knee was then brought into flexion and the distal femoral gap assessed for appropriate rotation.  Our distal femoral sizing guide was placed and sized appropriately.  Guide pins were placed in the appropriate external rotation.  This was assessed with the 4 in 1 cutting block and the flexion gap sizing block which was appropriate.  The distal femoral preparation was completed after the anterior, posterior and chamfer cuts were made.  The box cutting guide was placed and assessed.  The box cut was made.    At this time the trial implants were placed and knee assessed for stability, and flexion arc. The patella was prepared using free-hand technique and the three-holed lug drill guide was sized and appropriately  assessed. Patella tracking was found to be acceptable. The tibial component rotation was marked. The trials were removed. The tibial component was positioned and the keel was drilled and punched.    The wound was copiously irrigated with pulsitile lavage and the bony surfaces dried.  Cement was mixed on the back table and applied to all components.  Cementation of the femoral, tibial and patellar components was performed sequentially with removal of all excess cement. A trial insert was placed to provide compression while the cement dried and a 0.35% betadine solution was left to soak in the surgical field.     After the cement was dry, the trial poly insert was removed. Hemostasis was obtained with bovie electrocautery.  The knee was again copiously irrigated with pulsatile lavage. The final polyethylene insert was placed and the knee reduced.      1 gram of vancomycin powder was placed in the knee. The arthrotomy was closed with interrupted #1 vicryl suture and #2 quill,   an additional interrupted #1 vicryl layer in the fat fascia  the subcuticular layer was closed with 2-0 vicryl suture and a running 4-0 monocryl was used to closed the skin surface.  Surgicel sealant was placed over the top of the incision and sterile dressing placed over the wound.    Sponge and needle counts were correct.    The first-assistant was critical to all steps of the operation, including retraction and leg stabilization during exposure and bone preparation, as well as the deep and superficial wound closure.  Dr. Baker performed and/or supervised the key portions of this surgical procedure, including evaluation of the bone cuts, reshaping of the bony elements, and insertion of the provisional and final components.    Postoperative Plan:  The patient will be anticoagulated with 81mg aspirin twice daily for one month.   They will receive 24 hours of post-operative antibiotics.    Activity will be weight bearing as tolerated.    Signed  by: Gene Baker III, MD

## 2019-11-18 NOTE — INTERVAL H&P NOTE
The patient has been examined and the H&P has been reviewed:    I concur with the findings and no changes have occurred since H&P was written.    Anesthesia/Surgery risks, benefits and alternative options discussed and understood by patient/family.          Active Hospital Problems    Diagnosis  POA    S/P total knee replacement using cement, right 11/18/2019 [Z96.651]  Not Applicable      Resolved Hospital Problems   No resolved problems to display.

## 2019-11-19 VITALS
SYSTOLIC BLOOD PRESSURE: 121 MMHG | TEMPERATURE: 97 F | RESPIRATION RATE: 16 BRPM | HEART RATE: 71 BPM | BODY MASS INDEX: 30.9 KG/M2 | OXYGEN SATURATION: 94 % | DIASTOLIC BLOOD PRESSURE: 68 MMHG | WEIGHT: 180 LBS

## 2019-11-19 LAB
BUN SERPL-MCNC: 17 MG/DL (ref 6–30)
CHLORIDE SERPL-SCNC: 106 MMOL/L (ref 95–110)
CREAT SERPL-MCNC: 0.9 MG/DL (ref 0.5–1.4)
GLUCOSE SERPL-MCNC: 146 MG/DL (ref 70–110)
HCT VFR BLD CALC: 30 %PCV (ref 36–54)
POC IONIZED CALCIUM: 1.14 MMOL/L (ref 1.06–1.42)
POC TCO2 (MEASURED): 24 MMOL/L (ref 23–29)
POTASSIUM BLD-SCNC: 4.7 MMOL/L (ref 3.5–5.1)
SAMPLE: ABNORMAL
SODIUM BLD-SCNC: 139 MMOL/L (ref 136–145)

## 2019-11-19 PROCEDURE — 90471 IMMUNIZATION ADMIN: CPT | Performed by: ORTHOPAEDIC SURGERY

## 2019-11-19 PROCEDURE — G0008 ADMIN INFLUENZA VIRUS VAC: HCPCS | Performed by: ORTHOPAEDIC SURGERY

## 2019-11-19 PROCEDURE — 99900035 HC TECH TIME PER 15 MIN (STAT)

## 2019-11-19 PROCEDURE — 97110 THERAPEUTIC EXERCISES: CPT

## 2019-11-19 PROCEDURE — 90662 IIV NO PRSV INCREASED AG IM: CPT | Performed by: ORTHOPAEDIC SURGERY

## 2019-11-19 PROCEDURE — 97165 OT EVAL LOW COMPLEX 30 MIN: CPT

## 2019-11-19 PROCEDURE — 25000003 PHARM REV CODE 250: Performed by: ANESTHESIOLOGY

## 2019-11-19 PROCEDURE — 97535 SELF CARE MNGMENT TRAINING: CPT

## 2019-11-19 PROCEDURE — 63600175 PHARM REV CODE 636 W HCPCS: Performed by: NURSE PRACTITIONER

## 2019-11-19 PROCEDURE — 97161 PT EVAL LOW COMPLEX 20 MIN: CPT

## 2019-11-19 PROCEDURE — 97116 GAIT TRAINING THERAPY: CPT | Mod: 59

## 2019-11-19 PROCEDURE — 25000003 PHARM REV CODE 250: Performed by: NURSE PRACTITIONER

## 2019-11-19 PROCEDURE — 63600175 PHARM REV CODE 636 W HCPCS: Performed by: ORTHOPAEDIC SURGERY

## 2019-11-19 PROCEDURE — 97530 THERAPEUTIC ACTIVITIES: CPT

## 2019-11-19 RX ORDER — CELECOXIB 200 MG/1
200 CAPSULE ORAL DAILY
Status: DISCONTINUED | OUTPATIENT
Start: 2019-11-19 | End: 2019-11-19 | Stop reason: HOSPADM

## 2019-11-19 RX ADMIN — CEFAZOLIN 2 G: 1 INJECTION, POWDER, FOR SOLUTION INTRAMUSCULAR; INTRAVENOUS at 12:11

## 2019-11-19 RX ADMIN — ACETAMINOPHEN 1000 MG: 500 TABLET ORAL at 11:11

## 2019-11-19 RX ADMIN — ROPIVACAINE HYDROCHLORIDE: 2 INJECTION, SOLUTION EPIDURAL; INFILTRATION at 11:11

## 2019-11-19 RX ADMIN — ACETAMINOPHEN 1000 MG: 500 TABLET ORAL at 06:11

## 2019-11-19 RX ADMIN — CELECOXIB 200 MG: 200 CAPSULE ORAL at 11:11

## 2019-11-19 RX ADMIN — SENNOSIDES, DOCUSATE SODIUM 1 TABLET: 50; 8.6 TABLET, FILM COATED ORAL at 09:11

## 2019-11-19 RX ADMIN — ASPIRIN 81 MG: 81 TABLET, COATED ORAL at 09:11

## 2019-11-19 RX ADMIN — POLYETHYLENE GLYCOL (3350) 17 G: 17 POWDER, FOR SOLUTION ORAL at 09:11

## 2019-11-19 RX ADMIN — ACETAMINOPHEN 1000 MG: 500 TABLET ORAL at 12:11

## 2019-11-19 RX ADMIN — SODIUM CHLORIDE: 0.9 INJECTION, SOLUTION INTRAVENOUS at 12:11

## 2019-11-19 RX ADMIN — CEFAZOLIN 2 G: 1 INJECTION, POWDER, FOR SOLUTION INTRAMUSCULAR; INTRAVENOUS at 09:11

## 2019-11-19 RX ADMIN — FAMOTIDINE 20 MG: 20 TABLET ORAL at 09:11

## 2019-11-19 RX ADMIN — INFLUENZA A VIRUS A/MICHIGAN/45/2015 X-275 (H1N1) ANTIGEN (FORMALDEHYDE INACTIVATED), INFLUENZA A VIRUS A/SINGAPORE/INFIMH-16-0019/2016 IVR-186 (H3N2) ANTIGEN (FORMALDEHYDE INACTIVATED), AND INFLUENZA B VIRUS B/MARYLAND/15/2016 BX-69A (A B/COLORADO/6/2017-LIKE VIRUS) ANTIGEN (FORMALDEHYDE INACTIVATED) 0.5 ML: 60; 60; 60 INJECTION, SUSPENSION INTRAMUSCULAR at 11:11

## 2019-11-19 RX ADMIN — ROPIVACAINE HYDROCHLORIDE 8 ML/HR: 2 INJECTION, SOLUTION EPIDURAL; INFILTRATION at 04:11

## 2019-11-19 RX ADMIN — OXYCODONE HYDROCHLORIDE 5 MG: 5 TABLET ORAL at 09:11

## 2019-11-19 RX ADMIN — MUPIROCIN 1 G: 20 OINTMENT TOPICAL at 09:11

## 2019-11-19 NOTE — NURSING
Patient discharged to home at this time. Discharge and follow up instructions given to patient and daughter via a language line at the bedside. Both stated understanding and have no questions or concerns at this time. Patient and daughter also given On-Q teaching via the language line, with both stating full understanding. Peripheral IVs moved. Surgical dressing is clean, dry, and intact. Neurovascular system intact. Patient transported to awaiting vehicle via wheelchair. Respirations even and unlabored. No acute distress noted. Safety ensured.

## 2019-11-19 NOTE — PLAN OF CARE
Patient tolerated PT session well. Patient ambulated 100ft x2 trials with RW and SBA. She ascended/descended 1 curb step with RW and CGA. She performed R LE therex 2x10. She needs a RW, BSC, and HH PT at discharge. She is ready to discharge home from PT standpoint.

## 2019-11-19 NOTE — PLAN OF CARE
Problem: Occupational Therapy Goal  Goal: Occupational Therapy Goal  Description  Goals to be met by: 11/19/19     Patient will increase functional independence with ADLs by performing:    UE Dressing with [place].  LE Dressing with Modified [place] and Assistive Devices as needed.  Grooming while standing at sink with Modified [place].  Toileting from bedside commode with Modified [place] for hygiene and clothing management.   Bathing from  shower chair/bench with Modified [place].  Toilet transfer to toilet with Modified [place].     Outcome: Met

## 2019-11-19 NOTE — PLAN OF CARE
Plan of care reviewed with patient; verbalized understanding.   Medications reviewed and administered as ordered.  Rounding for safety and patient care per policy.   Safety precautions maintained. Knee precautions & towel underneath ankle maintained.  Polar ice in place throughout shift.  Call light within reach, bed wheels locked, bed in lowest position, side rails ^x2, safety maintained. NADN, Will continue monitor.

## 2019-11-19 NOTE — PT/OT/SLP EVAL
Physical Therapy Evaluation, Treatment, and Discharge Note    Patient Name:  Delmy Fairbanks   MRN:  [id number]     PT used language line for  throughout PT evaluation due to both patient and daughter in law only speaking Serbian. Mitch #[id number]    Recommendations:     Discharge Recommendations:  home health PT   Discharge Equipment Recommendations: bath bench, walker, rolling, bedside commode   Barriers to discharge: None    Assessment:     Delmy Fairbanks is a 69 y.o. female admitted with a medical diagnosis of S/P total knee replacement using cement, right. Patient tolerated PT session well. Patient ambulated 100ft x2 trials with RW and SBA. She ascended/descended 1 curb step with RW and CGA. She performed R LE therex 2x10. She needs a RW, BSC, and HH PT at discharge. She is ready to discharge home from PT standpoint.     Recent Surgery: Procedure(s) (LRB):  ARTHROPLASTY, KNEE, TOTAL-DEPUY-SIGMA/DEPUY-TC3/MBT back up (Right) 1 Day Post-Op    Plan:     During this hospitalization, patient does not require further acute PT services.  Please re-consult if situation changes.      Subjective     Chief Complaint: Pain in back of right knee.   Patient/Family Comments/goals: To walk without pain.   Pain/Comfort:  · Pain Rating 1: 2/10  · Location - Side 1: Right  · Location - Orientation 1: posterior  · Location 1: knee  · Pain Addressed 1: Pre-medicate for activity, Cessation of Activity, Nurse notified    Living Environment:  Patient lives with her , son, daughter in law, and grandson in a H with no steps to enter. Grandson is the only one who speaks English. Prior to admission, patients level of function was independent for functional mobility. Equipment used at home: none. Upon discharge, patient will have assistance from  and daughter in law.    Objective:     Communicated with RN prior to session.  Patient found up in chair with cryotherapy, perineural catheter, FCD  "upon PT entry to room.    General Precautions: Standard, fall   Orthopedic Precautions:RLE weight bearing as tolerated   Braces:   n/a    Exams:  · Cognitive Exam:  Patient is oriented to Person, Place, Time and Situation  · Sensation:    · -       Intact  · RLE ROM: WFL except limited at knee due to recent surgery   · RLE Strength: WFL except limited at knee due to recent surgery  · LLE ROM: WFL  · LLE Strength: WFL    Functional Mobility:  · Mat Mobility:     · Supine to Sit: supervision  · Sit to Supine: supervision  · Transfers:     · Sit to Stand:  stand by assistance with rolling walker x1 from bedside chair and x1 from mat with verbal cues for hand placement   · Gait: Patient ambulated 100ft x2 trials with Rolling Walker and SBA using 3-point gait. Patient demonstrated decreased josseline during gait due to pain, decreased ROM and decreased strength. Verbal cues provided for gait sequence and RW management.   · Stairs:  Pt ascended/descended 6" curb step with Rolling Walker with Contact Guard Assistance. Verbal cues provided for technique.      AM-PAC 6 CLICK MOBILITY  Total Score:23       Therapeutic Activities and Exercises:  Patient educated in and performed R LE exercises 2x10 for ankle pumps, quad set, glute set, SAQ over bolster, heel slides, hip abd/add, SLR, and LAQ.    Patient educated in:  -PT role and POC  -safety with transfers including hand placement  -gait sequencing and RW management  -OOB activity to maximize recovery including ambulating every hour to hour and a half at home   -car transfer  -curb training  -HEP for therex at home with handout provided in Nepali (daughter in law followed along with handout while patient performed and verbalized understanding for exercises at home)  -polar ice use      Patient left up in chair with all lines intact, call button in reach, RN notified and RN and daughter in law present.    GOALS:   Multidisciplinary Problems     Physical Therapy Goals     Not on " file          Multidisciplinary Problems (Resolved)        Problem: Physical Therapy Goal    Goal Priority Disciplines Outcome Goal Variances Interventions   Physical Therapy Goal   (Resolved)     PT, PT/OT Met                     History:     Past Medical History:   Diagnosis Date    Arthritis     Hypertension        Past Surgical History:   Procedure Laterality Date    COLONOSCOPY  01/2019    TOTAL KNEE ARTHROPLASTY Right 11/18/2019    Procedure: ARTHROPLASTY, KNEE, TOTAL-DEPUY-SIGMA/DEPUY-TC3/MBT back up;  Surgeon: Gene Baker III, MD;  Location: [facility];  Service: Orthopedics;  Laterality: Right;       Time Tracking:     PT Received On: 11/19/19  PT Start Time: 0911     PT Stop Time: 0946  PT Total Time (min): 35 min     Billable Minutes: Evaluation 10 Gait Training 15 and Therapeutic Exercise 10    Sara Jane, PT  11/19/2019

## 2019-11-19 NOTE — NURSING
Patient arrived to room 314. Daughter in law at bedside. NAD noted. Head to toe assessment completed per flow sheet. PCN infusing ropivacaine at 8ml/hr. Polar ice in place. FCDs to bilateral lower extremities. Patient acclimated to room and call light using  phone. Admit assessment complete using  phone. Will continue to monitor patient and administer care as prescribed. Safety ensured.

## 2019-11-19 NOTE — PROGRESS NOTES
[facility]  Orthopedics  Progress Note    Patient Name: Delmy Fairbanks  MRN: [id number]  Admission Date: 11/18/2019  Hospital Length of Stay: 0 days  Attending Provider: Gene Baker III, MD  Primary Care Provider: Primary Doctor No  Follow-up For: Procedure(s) (LRB):  ARTHROPLASTY, KNEE, TOTAL-DEPUY-SIGMA/DEPUY-TC3/MBT back up (Right)    Post-Operative Day: 1 Day Post-Op  Subjective:     Principal Problem:S/P total knee replacement using cement, right    Principal Orthopedic Problem: s/p R TKA    Interval History: NAEON. Pain 2/10 this am. Denies CP/SOB/N/V. No PT yest.     Review of patient's allergies indicates:  No Known Allergies    Current Facility-Administered Medications   Medication    0.9%  NaCl infusion    acetaminophen tablet 1,000 mg    aspirin EC tablet 81 mg    bisacodyl suppository 10 mg    ceFAZolin injection 2 g    chlorthalidone tablet 25 mg    famotidine tablet 20 mg    furosemide tablet 20 mg    influenza (HIGH-DOSE PF) vaccine 0.5 mL    morphine injection 2 mg    mupirocin 2 % ointment 1 g    naloxone 0.4 mg/mL injection 0.02 mg    ondansetron injection 4 mg    oxyCODONE immediate release tablet 10 mg    oxyCODONE immediate release tablet 5 mg    polyethylene glycol packet 17 g    pregabalin capsule 75 mg    promethazine (PHENERGAN) 6.25 mg in dextrose 5 % 50 mL IVPB    ropivacaine (PF) 2 mg/ml (0.2%) infusion    senna-docusate 8.6-50 mg per tablet 1 tablet     Objective:     Vital Signs (Most Recent):  Temp: 97.6 °F (36.4 °C) (11/19/19 0430)  Pulse: 63 (11/19/19 0430)  Resp: 16 (11/19/19 0430)  BP: (!) 109/56 (11/19/19 0430)  SpO2: (!) 94 % (11/19/19 0430) Vital Signs (24h Range):  Temp:  [97.5 °F (36.4 °C)-98.3 °F (36.8 °C)] 97.6 °F (36.4 °C)  Pulse:  [58-81] 63  Resp:  [13-20] 16  SpO2:  [89 %-100 %] 94 %  BP: (101-153)/(55-83) 109/56     Weight: 81.6 kg (180 lb)     Body mass index is 30.9 kg/m².      Intake/Output Summary (Last 24 hours)  at 11/19/2019 0628  Last data filed at 11/18/2019 1900  Gross per 24 hour   Intake 2295.07 ml   Output 800 ml   Net 1495.07 ml       Ortho/SPM Exam   AAOx4  NAD  Reg rate  No increased WOB  Dressing c/d/i  Polar ice in place  SILT T/SP/DP/Batista/Sa  Motor intact T/SP/DP  WWP extremities  FCDs in place and functioning      Significant Labs: All pertinent labs within the past 24 hours have been reviewed.  None    Significant Imaging: I have reviewed and interpreted all pertinent imaging results/findings.    Assessment/Plan:     * S/P total knee replacement using cement, right 11/18/2019  Delmy Fairbanks is a 69 y.o. female is s/p R TKA on 11/18/19    Surgical dressing C/D/I, dagmar hose and polar ice in pace  Pain control: multimodal, Anesthesia Surgical Home following  PT/OT: WBAT RLE  DVT PPx: ASA 81 BID, FCDs at all times when not ambulating  Abx: postop Ancef  Drain: none  Aege: none    Dispo: f/u PT reccs, plan for d/c home with CURT Robbins MD  Orthopedics  [facility]

## 2019-11-19 NOTE — DISCHARGE SUMMARY
[facility]  Orthopedics  Discharge Summary      Patient Name: Delmy Fairbanks  MRN: [id number]  Admission Date: 11/18/2019  Hospital Length of Stay: 0 days  Discharge Date and Time: 11/19/2019 11:34 AM  Attending Physician: Trinidad att. providers found   Discharging Provider: Nesha Robbins MD  Primary Care Provider: Primary Doctor Trinidad    HPI:   CC: Right knee pain     eDlmy Fairbanks is a 69 y.o. female with a history of Right knee pain. Pain is worse with activity and weight bearing.  Patient has experienced interference of activities of daily living due to decreased range of motion and an increase in joint pain and swelling.  Patient has failed non-operative treatment including NSAIDs, corticosteroid injections, viscosupplement injections, and activity modification.  Delmy Fairbanks currently ambulates independently.      Relevant medical conditions of significance in perioperative period:  HTN- on medication managed by pcp  Tobacco abuse- quit one month ago  Claudication- managed by pcp       Procedure(s) (LRB):  ARTHROPLASTY, KNEE, TOTAL-DEPUY-SIGMA/DEPUY-TC3/MBT back up (Right)      Hospital Course:  On 11/18/19, the patient arrived to the [facility] for proper pre-operative management.  Upon completion of pre-operative preparation, the patient was taken back to the operative theatre.  A right total knee arthroplasty was performed without complication and the patient was transported to the post anesthesia care unit in stable condition.  After appropriate recovery from the anaesthetic agents used during the surgery, the patient was then transported to the hospital inpatient floor.  The interim of the hospital stay from arrival on the floor up to discharge has been uncomplicated. The patient has tolerated regular diet with no nausea or vomiting.  The patient's pain has been controlled using a multimodal approach with the help of the anesthesia pain service.  Currently, the patient's pain is well controlled on an oral regimen.  The patient has been voiding without difficulty.  The patient began participation in physical therapy after surgery and has progressed throughout the entire hospital stay.  Currently, the physical therapy team feels that the patient's progress is sufficient to allow the patient to be discharged to home safely.  The patient agrees with this assessment and desires a discharge today.      Consults (From admission, onward)        Status Ordering Provider     Inpatient consult to Pain Management  Once     Provider:  (Not yet assigned)    Acknowledged GALINDO GUZMAN     Inpatient consult to Respiratory Care  Once     Provider:  (Not yet assigned)    Acknowledged GALINDO GUZMAN     Inpatient consult to Social Work  Once     Provider:  (Not yet assigned)    Acknowledged GALINDO GUZMAN          Significant Diagnostic Studies: No pertinent studies.    Pending Diagnostic Studies:     None        Final Active Diagnoses:    Diagnosis Date Noted POA    PRINCIPAL PROBLEM:  S/P total knee replacement using cement, right 11/18/2019 [Z96.651] 11/15/2019 Not Applicable      Problems Resolved During this Admission:      Discharged Condition: stable    Disposition: Home or Self Care    Follow Up:    Patient Instructions:      Ambulatory referral to Home Health   Referral Priority: Routine Referral Type: Home Health   Referral Reason: Specialty Services Required   Requested Specialty: Home Health Services   Number of Visits Requested: 1     Medications:  Reconciled Home Medications:      Medication List      CHANGE how you take these medications    furosemide 20 MG tablet  Commonly known as:  LASIX  Take 1 tablet (20 mg total) by mouth daily as needed (For Lower extremity  swelling.).  What changed:  when to take this        CONTINUE taking these medications    * acetaminophen 500 MG tablet  Commonly known as:  TYLENOL  Take 500 mg by mouth as needed for  Pain.     * acetaminophen 650 MG Tbsr  Commonly known as:  TYLENOL  Take 1 tablet (650 mg total) by mouth every 8 (eight) hours as needed.     aspirin 81 MG EC tablet  Commonly known as:  ECOTRIN  Take 1 tablet (81 mg total) by mouth 2 (two) times daily.     celecoxib 200 MG capsule  Commonly known as:  CeleBREX  Garibaldi you cápsula (200 mg en total) por vía oral diariamente.  (Take 1 capsule (200 mg total) by mouth once daily.)     chlorthalidone 25 MG Tab  Commonly known as:  HYGROTEN  Take 25 mg by mouth once daily.     naproxen 500 MG tablet  Commonly known as:  NAPROSYN  Take 1 tablet (500 mg total) by mouth 2 (two) times daily with meals.     oxyCODONE 5 MG immediate release tablet  Commonly known as:  ROXICODONE  Take 1-2 tabs by mouth every 4-6 hours as needed for pain     Stool Softener 100 MG capsule  Generic drug:  docusate sodium  Take 1 capsule (100 mg total) by mouth 2 (two) times daily as needed for Constipation.     Vitamin D2 50,000 unit Cap  Generic drug:  ergocalciferol  Take 50,000 Units by mouth every 7 days. Wednesdays         * This list has 2 medication(s) that are the same as other medications prescribed for you. Read the directions carefully, and ask your doctor or other care provider to review them with you.                Nesha Robbins MD  Orthopedics  [facility]

## 2019-11-19 NOTE — PT/OT/SLP EVAL
Occupational Therapy   Evaluation/Discharge Note    Name: Delmy Fairbanks  MRN: [id number]  Admitting Diagnosis:  S/P total knee replacement using cement, right 1 Day Post-Op    Recommendations:     Discharge Recommendations: home  Discharge Equipment Recommendations:  bedside commode, walker, rolling, bath bench  Barriers to discharge:  None    Assessment:     Delmy Fairbanks is a 69 y.o. female with a medical diagnosis of S/P total knee replacement using cement, right.  She was able to perform sit/stand and walk to bathroom c mod I and RW.  B UE are WFL.  Able to perform UB/LB dressing, toilet hygiene, and grooming c mod I.  Pt is progressing well and has met all OT goals. Performance deficits affecting function: impaired self care skills, impaired functional mobilty, orthopedic precautions.      Rehab Prognosis: Good; patient would benefit from acute skilled OT services to address these deficits and reach maximum level of function.       Plan:     Patient to be seen daily to address the above listed problems via self-care/home management, therapeutic activities, therapeutic exercises  · Plan of Care Expires: 11/19/19  · Plan of Care Reviewed with: patient, family    Subjective     Chief Complaint: Pt is s/p R TKA and is WBAT R LE  Patient/Family Comments/goals: To get better.    Occupational Profile:  Living Environment: Pt lives in a one story house c no UMESH and has a tub/shower combo.  Previous level of function: I PTA  Equipment Used at Home:  none  Assistance upon Discharge:     Pain/Comfort:  · Pain Rating 1: 0/10    Patients cultural, spiritual, Adventism conflicts given the current situation: no    Objective:     Communicated with: RN prior to session.  Patient found up in chair with cryotherapy, perineural catheter upon OT entry to room.    General Precautions: Standard, fall   Orthopedic Precautions:RLE weight bearing as tolerated   Braces: N/A     Occupational  Performance:        Functional Mobility/Transfers:  · Patient completed Sit <> Stand Transfer with modified independence  with  rolling walker   · Patient completed Toilet Transfer Stand Pivot technique with modified independence with  rolling walker and bedside commode  · Functional Mobility: Pt was able to walk to bathroom c mod I and RW.    Activities of Daily Living:  · Grooming: modified independence to brush teeth while standing at sink.  · Upper Body Dressing: modified independence to don shirt.  · Lower Body Dressing: modified independence to don pants, socks, and shoes.  · Toileting: modified independence for toilet hygiene.    Cognitive/Visual Perceptual:  Cognitive/Psychosocial Skills:     -       Oriented to: Person, Place, Time and Situation   -       Follows Commands/attention:Follows multistep  commands    Physical Exam:  Upper Extremity Range of Motion:     -       Right Upper Extremity: WFL  -       Left Upper Extremity: WFL  Upper Extremity Strength:    -       Right Upper Extremity: WFL  -       Left Upper Extremity: WFL    AMPAC 6 Click ADL:  AMPAC Total Score: 24    Treatment & Education:  Educated pt on bathing and car T/F's.  Education:    Patient left up in chair with all lines intact, call button in reach, RN notified and family present    GOALS:   Multidisciplinary Problems     Occupational Therapy Goals     Not on file          Multidisciplinary Problems (Resolved)        Problem: Occupational Therapy Goal    Goal Priority Disciplines Outcome Interventions   Occupational Therapy Goal   (Resolved)     OT, PT/OT Met    Description:  Goals to be met by: 11/19/19     Patient will increase functional independence with ADLs by performing:    UE Dressing with [place].  LE Dressing with Modified [place] and Assistive Devices as needed.  Grooming while standing at sink with Modified [place].  Toileting from bedside commode with Modified [place] for hygiene and clothing  management.   Bathing from  shower chair/bench with Modified [place].  Toilet transfer to toilet with Modified [place].                      History:     Past Medical History:   Diagnosis Date    Arthritis     Hypertension        Past Surgical History:   Procedure Laterality Date    COLONOSCOPY  01/2019    TOTAL KNEE ARTHROPLASTY Right 11/18/2019    Procedure: ARTHROPLASTY, KNEE, TOTAL-DEPUY-SIGMA/DEPUY-TC3/MBT back up;  Surgeon: Gene Baker III, MD;  Location: [facility];  Service: Orthopedics;  Laterality: Right;       Time Tracking:     OT Date of Treatment: 11/19/19  OT Start Time: 1001  OT Stop Time: 1040  OT Total Time (min): 39 min    Billable Minutes:Evaluation 15  Self Care/Home Management 15  Therapeutic Activity 9    ASAF Irizarry  11/19/2019

## 2019-11-19 NOTE — SUBJECTIVE & OBJECTIVE
Principal Problem:S/P total knee replacement using cement, right    Principal Orthopedic Problem: s/p R TKA    Interval History: NAEON. Pain 2/10 this am. Denies CP/SOB/N/V. No PT yest.     Review of patient's allergies indicates:  No Known Allergies    Current Facility-Administered Medications   Medication    0.9%  NaCl infusion    acetaminophen tablet 1,000 mg    aspirin EC tablet 81 mg    bisacodyl suppository 10 mg    ceFAZolin injection 2 g    chlorthalidone tablet 25 mg    famotidine tablet 20 mg    furosemide tablet 20 mg    influenza (HIGH-DOSE PF) vaccine 0.5 mL    morphine injection 2 mg    mupirocin 2 % ointment 1 g    naloxone 0.4 mg/mL injection 0.02 mg    ondansetron injection 4 mg    oxyCODONE immediate release tablet 10 mg    oxyCODONE immediate release tablet 5 mg    polyethylene glycol packet 17 g    pregabalin capsule 75 mg    promethazine (PHENERGAN) 6.25 mg in dextrose 5 % 50 mL IVPB    ropivacaine (PF) 2 mg/ml (0.2%) infusion    senna-docusate 8.6-50 mg per tablet 1 tablet     Objective:     Vital Signs (Most Recent):  Temp: 97.6 °F (36.4 °C) (11/19/19 0430)  Pulse: 63 (11/19/19 0430)  Resp: 16 (11/19/19 0430)  BP: (!) 109/56 (11/19/19 0430)  SpO2: (!) 94 % (11/19/19 0430) Vital Signs (24h Range):  Temp:  [97.5 °F (36.4 °C)-98.3 °F (36.8 °C)] 97.6 °F (36.4 °C)  Pulse:  [58-81] 63  Resp:  [13-20] 16  SpO2:  [89 %-100 %] 94 %  BP: (101-153)/(55-83) 109/56     Weight: 81.6 kg (180 lb)     Body mass index is 30.9 kg/m².      Intake/Output Summary (Last 24 hours) at 11/19/2019 0628  Last data filed at 11/18/2019 1900  Gross per 24 hour   Intake 2295.07 ml   Output 800 ml   Net 1495.07 ml       Ortho/SPM Exam   AAOx4  NAD  Reg rate  No increased WOB  Dressing c/d/i  Polar ice in place  SILT T/SP/DP/Batista/Sa  Motor intact T/SP/DP  WWP extremities  FCDs in place and functioning      Significant Labs: All pertinent labs within the past 24 hours have been reviewed.  None    Significant  Imaging: I have reviewed and interpreted all pertinent imaging results/findings.

## 2019-11-19 NOTE — PROGRESS NOTES
Acute Pain Service and Perioperative Surgical Home Progress Note    HPI  Delmy Fairbanks is a 69 y.o., female, progressing well s/p total knee replacement with PMH HTN has no medical complaints at this time.    Interval history      Surgery:  Procedure(s) (LRB):  ARTHROPLASTY, KNEE, TOTAL-DEPUY-SIGMA/DEPUY-TC3/MBT back up (Right)    Post Op Day #: 1    Catheter type: Perineural Adductor Canal    Infusion type: Ropivacaine 0.2%  8 ml/hr basal    Problem List:    Active Hospital Problems    Diagnosis  POA    *S/P total knee replacement using cement, right 11/18/2019 [Z96.651]  Not Applicable      Resolved Hospital Problems   No resolved problems to display.       Subjective:       General appearance of alert, oriented, no complaints   Pain with rest: 5    Numbers   Pain with movement: 5    Numbers   Side Effects    1. Pruritis No    2. Nausea No    3. Motor Blockade No, 0=Ability to raise lower extremities off bed    4. Sedation No, 1=awake and alert    Schedule Medications:    acetaminophen  1,000 mg Oral Q6H    aspirin  81 mg Oral BID    ceFAZolin (ANCEF) IVPB  2 g Intravenous Q8H    chlorthalidone  25 mg Oral Daily    famotidine  20 mg Oral BID    mupirocin  1 g Nasal BID    oxyCODONE        polyethylene glycol  17 g Oral Daily    pregabalin  75 mg Oral QHS    senna-docusate 8.6-50 mg  1 tablet Oral BID        Continuous Infusions:   sodium chloride 0.9% 150 mL/hr at 11/19/19 0013    ropivacaine (PF) 2 mg/ml (0.2%) 8 mL/hr (11/19/19 0401)        PRN Medications:  bisacodyl, furosemide, influenza, morphine, naloxone, ondansetron, oxyCODONE, oxyCODONE, promethazine (PHENERGAN) IVPB       Antibiotics:  Antibiotics (From admission, onward)    Start     Stop Route Frequency Ordered    11/18/19 2300  ceFAZolin injection 2 g      11/19 1618 IV Every 8 hours (non-standard times) 11/18/19 1815    11/18/19 2100  mupirocin 2 % ointment 1 g      11/23 2059 Nasl 2 times daily 11/18/19 2000              Objective:     Catheter site clean, dry, intact          Vital Signs (Most Recent):  Temp: 36.4 °C (97.6 °F) (11/19/19 0430)  Pulse: 63 (11/19/19 0430)  Resp: 16 (11/19/19 0430)  BP: (!) 109/56 (11/19/19 0430)  SpO2:  95% RA (11/19/19 0500) Vital Signs Range (Last 24H):  Temp:  [36.4 °C (97.5 °F)-36.8 °C (98.3 °F)]   Pulse:  [58-81]   Resp:  [13-20]   BP: (101-153)/(55-83)   SpO2:  [89 %-100 %]          I & O (Last 24H):    Intake/Output Summary (Last 24 hours) at 11/19/2019 0522  Last data filed at 11/18/2019 1900  Gross per 24 hour   Intake 2295.07 ml   Output 800 ml   Net 1495.07 ml       Physical Exam:    GA: Alert, comfortable, no acute distress.   Pulmonary: Clear to auscultation A/P/L. No wheezing, crackles, or rhonchi.  Cardiac: RRR S1 & S2 w/o rubs/murmurs/gallops.   Abdominal:Bowel sounds present. No tenderness to palpation or distension. No appreciable hepatosplenomegaly.   Skin: No jaundice, rashes, or visible lesions.         Laboratory:  CBC:   Recent Labs     11/19/19  0415   HCT 30*       BMP: No results for input(s): NA, K, CO2, CL, BUN, CREATININE, GLU, MG, PHOS, CALCIUM in the last 72 hours.    No results for input(s): PT, INR, PROTIME, APTT in the last 72 hours.      Anticoagulant dose ASA at 1 mg daily.    Assessment:  HTN- well controlled          Pain control adequate    Plan:     1) Pain:    Adductor canal perineural catheter in place and infusing. Good level. Multimodal pain regimen with acetaminophen, Celebrex, Lyrica, and prn oxycodone given  Will continue to monitor. Plan to discharge with On-Q on POD #1.   2) FEN/GI: Tolerating liquids, advance diet as tolerated. no flatus. No BM as of yet.   3) Dispo: Pt working well with PT/OT. Case management and SW for placement. Plan for discharge POD #1.        Evaluator Toussaint Battley III, EFREN   I have seen the patient, reviewed the Nurse Practitioner's assessment and plan. I have personally interviewed and examined the patient at bedside  and: agree with the findings.   Pain well controlled with multimodals; d/c home today with ONQ  Tolerating po well  Meeting PT/OT goals  D/c home today; have received Kalyan's number (relative) who speaks english so we may have a person to talk to when we call for OnQ

## 2019-11-20 ENCOUNTER — TELEPHONE (OUTPATIENT)
Dept: ORTHOPEDICS | Facility: CLINIC | Age: 69
End: 2019-11-20

## 2019-11-20 PROCEDURE — G0180 MD CERTIFICATION HHA PATIENT: HCPCS | Mod: ,,, | Performed by: ORTHOPAEDIC SURGERY

## 2019-11-20 PROCEDURE — G0180 PR HOME HEALTH MD CERTIFICATION: ICD-10-PCS | Mod: ,,, | Performed by: ORTHOPAEDIC SURGERY

## 2019-11-20 NOTE — TELEPHONE ENCOUNTER
----- Message from Julissa Rivas sent at 11/20/2019  1:33 PM CST -----  Contact: pt   Please call pt at [phone] ( is needed)    Patient surgical gauge has light blood and concern with no other problems    Thank you

## 2019-11-20 NOTE — PROGRESS NOTES
Spoke with pt with  on the line to translate. Pt explained that her incision is fine no bleeding or anything, but the catheter on the side that is attached is bleeding a little where they put the numbing medicine in for 48 hours. will call anesthesiologist at [phone] to help pt out. I will call  back to make sure everything went well and it it was not her incision.

## 2019-11-20 NOTE — PLAN OF CARE
11/19 at 1500: Per Unit Roby Karen, the patient chose [facility].  Patient choice form signed.

## 2019-11-21 NOTE — PROGRESS NOTES
11/21  1500    Patient called at home. Discussed discontinuation of perineural catheter with patient and caretaker.  Patient instructed to take something for pain before removing catheter.  Catheter removed and blue tip in tact.  Site clean and dry per patient.  All questions answered.

## 2019-12-02 ENCOUNTER — EXTERNAL HOME HEALTH (OUTPATIENT)
Dept: HOME HEALTH SERVICES | Facility: HOSPITAL | Age: 69
End: 2019-12-02
Payer: MEDICARE

## 2019-12-02 ENCOUNTER — OFFICE VISIT (OUTPATIENT)
Dept: ORTHOPEDICS | Facility: CLINIC | Age: 69
End: 2019-12-02
Payer: MEDICARE

## 2019-12-02 VITALS — BODY MASS INDEX: 30.71 KG/M2 | WEIGHT: 179.88 LBS | HEIGHT: 64 IN

## 2019-12-02 DIAGNOSIS — Z96.651 S/P TOTAL KNEE REPLACEMENT USING CEMENT, RIGHT: Primary | ICD-10-CM

## 2019-12-02 PROCEDURE — 99999 PR PBB SHADOW E&M-EST. PATIENT-LVL III: CPT | Mod: PBBFAC,HCNC,, | Performed by: NURSE PRACTITIONER

## 2019-12-02 PROCEDURE — 99999 PR PBB SHADOW E&M-EST. PATIENT-LVL III: ICD-10-PCS | Mod: PBBFAC,HCNC,, | Performed by: NURSE PRACTITIONER

## 2019-12-02 PROCEDURE — 99024 POSTOP FOLLOW-UP VISIT: CPT | Mod: HCNC,S$GLB,, | Performed by: NURSE PRACTITIONER

## 2019-12-02 PROCEDURE — 99024 PR POST-OP FOLLOW-UP VISIT: ICD-10-PCS | Mod: HCNC,S$GLB,, | Performed by: NURSE PRACTITIONER

## 2019-12-02 RX ORDER — HYDROCODONE BITARTRATE AND ACETAMINOPHEN 5; 325 MG/1; MG/1
1 TABLET ORAL EVERY 6 HOURS PRN
Qty: 28 TABLET | Refills: 0 | Status: SHIPPED | OUTPATIENT
Start: 2019-12-02 | End: 2019-12-12

## 2019-12-02 NOTE — PROGRESS NOTES
"Delmy Fairbanks presents for initial post-operative visit following a right total knee arthroplasty performed by Dr. Baker on 11/18/2019. Tolerating pain medication well.      Exam:   Height 5' 4" (1.626 m), weight 81.6 kg (179 lb 14.3 oz).   Ambulating well with assistive device.  Incision is clean and dry without drainage or erythema. ROM:0-105    Initial post-operative radiographs reviewed today revealing a well fixed and aligned prosthesis.    A/P:  2 weeks s/p right total knee replacement   - The patient was advised to keep the incision clean and dry for the next 24 hours after which she may wash the area with antibacterial soap in the shower. Will not submerge until the incision is completely healed.   - Outpatient PT: orders entered for [facility] as Home health will be ending soon. Patient states that she will only be able to afford to go once a week. I advised her of the importance of continuing to do her home exercises  - Continue aspirin for 1 month from surgery.  - Pain medication refilled with norco as patient rates her pain an 8 out of 10, but I explained that going forward she will need to wean down to tylenol  - Follow up in 4 weeks with Dr. Baker. Pt will call clinic with problems/concerns.     "

## 2019-12-05 ENCOUNTER — TELEPHONE (OUTPATIENT)
Dept: HOME HEALTH SERVICES | Facility: HOSPITAL | Age: 69
End: 2019-12-05

## 2019-12-11 ENCOUNTER — CLINICAL SUPPORT (OUTPATIENT)
Dept: REHABILITATION | Facility: HOSPITAL | Age: 69
End: 2019-12-11
Payer: MEDICARE

## 2019-12-11 DIAGNOSIS — R26.2 DIFFICULTY WALKING: ICD-10-CM

## 2019-12-11 DIAGNOSIS — M25.561 RIGHT KNEE PAIN, UNSPECIFIED CHRONICITY: ICD-10-CM

## 2019-12-11 PROCEDURE — G8978 MOBILITY CURRENT STATUS: HCPCS | Mod: CL,HCNC,PN

## 2019-12-11 PROCEDURE — G8979 MOBILITY GOAL STATUS: HCPCS | Mod: CJ,HCNC,PN

## 2019-12-11 PROCEDURE — 97161 PT EVAL LOW COMPLEX 20 MIN: CPT | Mod: HCNC,PN

## 2019-12-11 PROCEDURE — 97110 THERAPEUTIC EXERCISES: CPT | Mod: HCNC,PN

## 2019-12-12 NOTE — PLAN OF CARE
[facility]  Physical Therapy Initial Evaluation    Name: Delmy Fairbanks  Clinic Number: [id number]    Therapy Diagnosis:   Encounter Diagnoses   Name Primary?    Right knee pain, unspecified chronicity     Difficulty walking      Physician: Melisa Encarnacion NP    Physician Orders: PT Eval and Treat   Medical Diagnosis: Z96.651 (ICD-10-CM) - S/P total knee replacement using cement, right  Date of Surgery: 11/18/2019    Evaluation Date: 12/11/2019  Authorization Period Expiration: 12/31/2019  Plan of Care Certification Period: 1/31/2020  Visit # / Visits authorized: 1/ 20    Time In: 1100  Time Out: 1200  Total Billable Time: 60 minutes    Precautions: Standard    Subjective   Date of onset: 11/18/2019  History of current condition - Delmy reports to PT s/p R TKR on 11/18/2019. Pt received home health x 2 weeks and has been discharged from that discipline.       Past Medical History:   Diagnosis Date    Arthritis     Hypertension      Delmy Fairbanks  has a past surgical history that includes Colonoscopy (01/2019) and Total knee arthroplasty (Right, 11/18/2019).    Delmy SHER has a current medication list which includes the following prescription(s): acetaminophen, acetaminophen, aspirin, celecoxib, chlorthalidone, docusate sodium, ergocalciferol, furosemide, hydrocodone-acetaminophen, naproxen, and oxycodone.    Review of patient's allergies indicates:  No Known Allergies       Prior Therapy: Home Health  Social History: Single story dwelling   Occupation: NA  Prior Level of Function: Independent with all activity  Current Level of Function: Difficulty with community ambulation    Pain:  Current 3/10, worst 5/10, best 1/10   Location: right knee   Description: Aching, Tight and Numb  Aggravating Factors: Walking  Easing Factors: ice and rest    Pts goals: Improve mobiilty    Objective     Observation: Pt enters indepdnently without AD      Gait: (R) lateral trunk lean  with hip hiking    Range of Motion: AROM (PROM):    Knee Left Right   Extension 0 (5) degrees 0 (0) degrees   Flexion 135 (140) degrees 100 (105) degrees       Strength:  Hip Left Right   Flexion 4/5 4-/5   Abduction 4/5 3+/5   Adduction 4/5 3+/5   Extension 4/5 4-/5     Knee Left Right   Extension 5/5 3+/5   Flexion 5/5 3+/5     Special Tests:    Knee Left Right   Homans Negative Negative         Palpation: Mild TTP  - Medial JL    Edema: Min/mod        Foto: missed         TREATMENT   Treatment Time In: 1140  Treatment Time Out: 1200  Total Treatment time separate from Evaluation time:20    Delmy received therapeutic exercises to develop strength, ROM, flexibility and core stabilization for 20 minutes including:  - Heel slides  - LAQ  - Nu step  - SLR 3 way  - Prone TKE  - Gait training with VC/TC      Home Exercises and Patient Education Provided    Education provided re: HEP, Dx, POC    Written Home Exercises Provided: .  Exercises were reviewed and Delmy was able to demonstrate them prior to the end of the session.   Pt received a written copy of exercises to perform at home. Delmy demonstrated good  understanding of the education provided.     See EMR under patient instructions for exercises given.   Assessment   Delmy SHER is a 69 y.o. female referred to outpatient Physical Therapy with a medical diagnosis of s/p (R) TKR. Pt presents with primary impairments including strength, ROM, edema, balance/gait deficits, and pain which limits functional mobility and ADL tolerance. This pt is an excellent candidate for skilled PT and has progressed well since her surgery date. Plan to see pt once a week at her request secondary to financial limitations.     Pt prognosis is Excellent.   Pt will benefit from skilled outpatient Physical Therapy to address the deficits stated above and in the chart below, provide pt/family education, and to maximize pt's level of independence.     Plan of care discussed with patient:  Yes  Pt's spiritual, cultural and educational needs considered and patient is agreeable to the plan of care and goals as stated below:     Anticipated Barriers for therapy: None    Medical Necessity is demonstrated by the following  History  Co-morbidities and personal factors that may impact the plan of care Co-morbidities:   See above    Personal Factors:   ESL     low   Examination  Body Structures and Functions, activity limitations and participation restrictions that may impact the plan of care Body Regions:   lower extremities    Body Systems:    ROM  strength  balance  gait  transfers  edema    Participation Restrictions:   Walking, Prolonged standing, stairs, squatting    Activity limitations:   Learning and applying knowledge  no deficits    Mobility  lifting and carrying objects  walking    Self care  dressing    Domestic Life  doing house work (cleaning house, washing dishes, laundry)  assisting others    Life Areas  no deficits    Community and Social Life  no deficits         low   Clinical Presentation stable and uncomplicated low   Decision Making/ Complexity Score: low     Goals:    Short Term Goals (4 Weeks):  - Pt will increase (R) knee ROM to >75% of the contralateral side for improved gait mechanics  - Pt will increase RLE strength to >4/5 for improved stability withstanding ADL's  - Decrease gross Pain by 50% for improve dtolerance to community ambulation   - Pt independent with HEP to improve tolerance to exercise progressions.     Long Term Goals (8 Weeks):  - Pt will increase (R) knee ROM to 90% of the contralateral side for improved gait and squatting mechanics  - Resolution of edema   - Pt will report improvement in overall functional abilities, evidenced by improved score on  FOTO to 40% limitation or better.         Plan   Certification Period/Plan of care expiration: 12/11/2019 to 1/31/2020    Outpatient Physical Therapy 1 times weekly for 10 weeks to include the following interventions:  Gait Training, Manual Therapy, Moist Heat/ Ice, Neuromuscular Re-ed, Patient Education, Therapeutic Activites and Therapeutic Exercise.     Brad Garcia, PT, DPT, OCS

## 2019-12-17 ENCOUNTER — TELEPHONE (OUTPATIENT)
Dept: ORTHOPEDICS | Facility: CLINIC | Age: 69
End: 2019-12-17

## 2019-12-17 DIAGNOSIS — Z96.659 STATUS POST KNEE REPLACEMENT, UNSPECIFIED LATERALITY: Primary | ICD-10-CM

## 2019-12-17 RX ORDER — HYDROCODONE BITARTRATE AND ACETAMINOPHEN 5; 325 MG/1; MG/1
1 TABLET ORAL EVERY 6 HOURS PRN
Qty: 28 TABLET | Refills: 0 | Status: SHIPPED | OUTPATIENT
Start: 2019-12-17 | End: 2019-12-27

## 2019-12-17 NOTE — TELEPHONE ENCOUNTER
----- Message from Julissa Rivas sent at 12/17/2019  9:11 AM CST -----  Contact: pt   Please call pt at [phone]    Refill request for oxyCODONE (ROXICODONE) 5 MG immediate release tablet    Use [id number] - [place] - [address] AT [facility] [place] [phone] (Phone)  [phone] (Fax)    Patient has been out of meds for 3 days    Thank you

## 2019-12-18 ENCOUNTER — CLINICAL SUPPORT (OUTPATIENT)
Dept: REHABILITATION | Facility: HOSPITAL | Age: 69
End: 2019-12-18
Payer: MEDICARE

## 2019-12-18 DIAGNOSIS — R26.2 DIFFICULTY WALKING: ICD-10-CM

## 2019-12-18 DIAGNOSIS — M25.561 RIGHT KNEE PAIN, UNSPECIFIED CHRONICITY: ICD-10-CM

## 2019-12-18 PROCEDURE — 97140 MANUAL THERAPY 1/> REGIONS: CPT | Mod: HCNC,PN

## 2019-12-18 PROCEDURE — 97110 THERAPEUTIC EXERCISES: CPT | Mod: HCNC,PN

## 2019-12-18 NOTE — PROGRESS NOTES
"  Physical Therapy Daily Treatment Note     Name: Delmy Fairbanks  Clinic Number: [id number]    Therapy Diagnosis:   Encounter Diagnoses   Name Primary?    Right knee pain, unspecified chronicity     Difficulty walking      Physician: Gene Baker III, *    Visit Date: 12/18/2019    Physician Orders: PT Eval and Treat   Medical Diagnosis: Z96.651 (ICD-10-CM) - S/P total knee replacement using cement, right  Date of Surgery: 11/18/2019     Evaluation Date: 12/11/2019  Authorization Period Expiration: 12/31/2019  Plan of Care Certification Period: 1/31/2020  Visit # / Visits authorized: 2/ 20   Foto: 1/5  Cost: 118.42  Total: 201.30    Time In: 11:00 AM   Time Out: 11:55 AM   Total Billable Time: 55 minutes 3TE, 1 MT    Precautions: Standard    Subjective     Pt reports: pt reports feeling R knee soreness and occasional hamstring cramping..  She was compliant with home exercise program.  Response to previous treatment: initial evaluation previous  Functional change: none stated    Pain: 3/10  Location: right knee      Objective       Delmy received therapeutic exercisesto develop strength, ROM, flexibility and core stabilization for 40 minutes including:  -Hamstring st  15"x3  -Hip adduction iso 5"x20  - quadriceps set  10"x10  - Heel slides  10"x10 w/ B strpas  - LAQ   2x10 R  - Nu step  (Level 4) x 8 minutes  - SLR 3 way     2x10 1.5 #  - Prone TKE  NEXT  Goniometrics: AROM 2*-116 R knee    Delmy received the following manual therapy techniques: Joint mobilizations and Soft tissue Mobilization were applied to the: Right knee for 15 minutes, including:  -STM to Right quadriceps mm including mm splaying  -STM to Right IT band   -STM to general medial/ lateral jointline  -Manual bouts of knee extenstion stretit    Home Exercises Provided and Patient Education Provided     Education provided:   - cont HEP     Written Home Exercises Provided: Patient instructed to cont prior HEP.  Exercises were reviewed and " Delmy was able to demonstrate them prior to the end of the session.  Delmy demonstrated good  understanding of the education provided.     See EMR under Patient Instructions for exercises provided prior visit.    Assessment     Pt able to complete session with no reports of increased pain today. She demonstrated good understanding and performance of therex with no difficulty today. She responded well to manual therapy reporting reduced right medial knee soreness.   Delmy is progressing well towards her goals.   Pt prognosis is Good.     Pt will continue to benefit from skilled outpatient physical therapy to address the deficits listed in the problem list box on initial evaluation, provide pt/family education and to maximize pt's level of independence in the home and community environment.     Pt's spiritual, cultural and educational needs considered and pt agreeable to plan of care and goals.     Anticipated barriers to physical therapy: see precautions.     Goals:     Short Term Goals (4 Weeks):  - Pt will increase (R) knee ROM to >75% of the contralateral side for improved gait mechanics (Ongoing, Not Met)  - Pt will increase RLE strength to >4/5 for improved stability withstanding ADL's(Ongoing, Not Met)  - Decrease gross Pain by 50% for improve dtolerance to community ambulation (Ongoing, Not Met)  - Pt independent with HEP to improve tolerance to exercise progressions. (Ongoing, Not Met)       Long Term Goals (8 Weeks):  - Pt will increase (R) knee ROM to 90% of the contralateral side for improved gait and squatting mechanics(Ongoing, Not Met)  - Resolution of edema (Ongoing, Not Met)  - Pt will report improvement in overall functional abilities, evidenced by improved score on  FOTO to 40% limitation or better. (Ongoing, Not Met)         Plan     Cont PT as per POC, progress    Dakota Hernandez, WINIFRED

## 2019-12-24 NOTE — PROGRESS NOTES
"  Physical Therapy Daily Treatment Note     Name: Delmy Fairbanks  Clinic Number: [id number]    Therapy Diagnosis:   Encounter Diagnoses   Name Primary?    Right knee pain, unspecified chronicity     Difficulty walking      Physician: Gene Baker III, *    Visit Date: 12/26/2019    Physician Orders: PT Eval and Treat   Medical Diagnosis: Z96.651 (ICD-10-CM) - S/P total knee replacement using cement, right  Date of Surgery: 11/18/2019     Evaluation Date: 12/11/2019  Authorization Period Expiration: 12/31/2019  Plan of Care Certification Period: 1/31/2020  Visit # / Visits authorized: 3/ 20   Foto: 3/5  Cost: 60.64  Total: 261.94    Time In:  1000 AM   Time Out: 11:05 AM   Total Billable Time: 30 minutes 1TE, 1 MT    Precautions: Standard    Subjective     Pt reports: pt reports feeling R knee soreness and occasional hamstring cramping..  She was compliant with home exercise program.  Response to previous treatment: initial evaluation previous  Functional change: none stated    Pain: /10  Location: right knee      Objective       Delmy received therapeutic exercisesto develop strength, ROM, flexibility and core stabilization for 40 minutes including:  -Hamstring st  15"x3  -Hip adduction iso 5"x20  - quadriceps set  10"x10  - Heel slides  10"x10 w/ B straps  - LAQ   2x10 R  - Nu step  (Level 4) x 8 minutes (Bike today)  - SLR 3 way     2x10 1.5 #  - Prone TKE  2x10  Goniometrics: AROM 2*-116 R knee    Delmy received the following manual therapy techniques: Joint mobilizations and Soft tissue Mobilization were applied to the: Right knee for 15 minutes, including:  -STM to Right quadriceps mm including mm splaying  -STM to Right IT band   -STM to general medial/ lateral jointline  -Manual bouts of knee extenstion stretit    Home Exercises Provided and Patient Education Provided     Education provided:   - cont HEP     Written Home Exercises Provided: Patient instructed to cont prior HEP.  Exercises were " reviewed and Delmy was able to demonstrate them prior to the end of the session.  Delmy demonstrated good  understanding of the education provided.     See EMR under Patient Instructions for exercises provided prior visit.    Assessment     Pt able to complete session with no reports of increased pain today. She demonstrated good understanding and performance of therex with no difficulty today. She responded well to manual therapy reporting reduced right medial knee soreness.   Delmy is progressing well towards her goals.   Pt prognosis is Good.     Pt will continue to benefit from skilled outpatient physical therapy to address the deficits listed in the problem list box on initial evaluation, provide pt/family education and to maximize pt's level of independence in the home and community environment.     Pt's spiritual, cultural and educational needs considered and pt agreeable to plan of care and goals.     Anticipated barriers to physical therapy: see precautions.     Goals:     Short Term Goals (4 Weeks):  - Pt will increase (R) knee ROM to >75% of the contralateral side for improved gait mechanics (Ongoing, Not Met)  - Pt will increase RLE strength to >4/5 for improved stability withstanding ADL's(Ongoing, Not Met)  - Decrease gross Pain by 50% for improve dtolerance to community ambulation (Ongoing, Not Met)  - Pt independent with HEP to improve tolerance to exercise progressions. (Ongoing, Not Met)       Long Term Goals (8 Weeks):  - Pt will increase (R) knee ROM to 90% of the contralateral side for improved gait and squatting mechanics(Ongoing, Not Met)  - Resolution of edema (Ongoing, Not Met)  - Pt will report improvement in overall functional abilities, evidenced by improved score on  FOTO to 40% limitation or better. (Ongoing, Not Met)         Plan     Cont PT as per POC, aidee Navarro, PTA

## 2019-12-26 ENCOUNTER — CLINICAL SUPPORT (OUTPATIENT)
Dept: REHABILITATION | Facility: HOSPITAL | Age: 69
End: 2019-12-26
Payer: MEDICARE

## 2019-12-26 DIAGNOSIS — R26.2 DIFFICULTY WALKING: ICD-10-CM

## 2019-12-26 DIAGNOSIS — M25.561 RIGHT KNEE PAIN, UNSPECIFIED CHRONICITY: ICD-10-CM

## 2019-12-26 PROCEDURE — 97110 THERAPEUTIC EXERCISES: CPT | Mod: HCNC,PN

## 2019-12-26 PROCEDURE — 97140 MANUAL THERAPY 1/> REGIONS: CPT | Mod: HCNC,PN

## 2019-12-29 ENCOUNTER — NURSE TRIAGE (OUTPATIENT)
Dept: ADMINISTRATIVE | Facility: CLINIC | Age: 69
End: 2019-12-29

## 2019-12-29 NOTE — TELEPHONE ENCOUNTER
Patient was called by Romero Line Valerie ID# [id number]. When  asked the patient to confirm demographic information, the patient hung the line up.   Reason for Disposition   Unable to complete triage due to phone connection issues    Additional Information   Negative: Caller is angry or rude (e.g., hangs up, verbally abusive, yelling)   Negative: Caller hangs up   Negative: Caller has already spoken with the PCP and has no further questions.   Negative: Caller has already spoken with another triager and has no further questions.   Negative: Caller has already spoken with another triager or PCP AND has further questions AND triager able to answer questions.   Negative: Busy signal.  First attempt to contact caller.  Follow-up call scheduled within 15 minutes.   Negative: No answer.  First attempt to contact caller.  Follow-up call scheduled within 15 minutes.   Negative: Message left on identified voice mail   Negative: Message left on unidentified voice mail.  Phone number verified.   Negative: Message left with person in household.   Negative: Wrong number reached.  Phone number verified.   Negative: Second attempt to contact family AND no contact made.  Phone number verified.   Negative: Cell phone out of range.  Phone number verified.   Negative: Pager number given.  Answering service notified.   Negative: Patient already left for the hospital/clinic.   Negative: Caller has cancelled the call before the first contact    Protocols used: NO CONTACT OR DUPLICATE CONTACT CALL-A-

## 2019-12-29 NOTE — TELEPHONE ENCOUNTER
Reason for Disposition   RN needs further essential information from caller in order to complete triage    Additional Information   Negative: [1] Caller is not with the adult (patient) AND [2] reporting urgent symptoms   Negative: Lab result questions   Negative: Medication questions   Negative: Caller can't be reached by phone   Negative: Caller has already spoken to PCP or another triager    Protocols used: INFORMATION ONLY CALL-A-  Incoming call received from Honey who identified herself as working with the International Department (). Caller identified patient's demographic and advised that she was contacting oncall to deliver a message for a return call to the patient and reports a language line should be used as patient is a Azeri speaker.

## 2019-12-30 DIAGNOSIS — Z96.659 STATUS POST KNEE REPLACEMENT, UNSPECIFIED LATERALITY: ICD-10-CM

## 2019-12-30 DIAGNOSIS — Z96.659 STATUS POST KNEE REPLACEMENT, UNSPECIFIED LATERALITY: Primary | ICD-10-CM

## 2019-12-30 RX ORDER — TRAMADOL HYDROCHLORIDE 50 MG/1
50 TABLET ORAL EVERY 6 HOURS PRN
Qty: 28 TABLET | Refills: 1 | Status: SHIPPED | OUTPATIENT
Start: 2019-12-30 | End: 2020-01-09

## 2019-12-30 RX ORDER — TRAMADOL HYDROCHLORIDE 50 MG/1
50 TABLET ORAL EVERY 6 HOURS PRN
Qty: 28 TABLET | Refills: 1 | Status: SHIPPED | OUTPATIENT
Start: 2019-12-30 | End: 2019-12-30

## 2020-01-02 ENCOUNTER — HOSPITAL ENCOUNTER (OUTPATIENT)
Dept: RADIOLOGY | Facility: HOSPITAL | Age: 70
Discharge: HOME | End: 2020-01-02
Attending: ORTHOPAEDIC SURGERY
Payer: MEDICARE

## 2020-01-02 ENCOUNTER — OFFICE VISIT (OUTPATIENT)
Dept: ORTHOPEDICS | Facility: CLINIC | Age: 70
End: 2020-01-02
Payer: MEDICARE

## 2020-01-02 VITALS — BODY MASS INDEX: 30.56 KG/M2 | WEIGHT: 179 LBS | HEIGHT: 64 IN

## 2020-01-02 DIAGNOSIS — Z96.659 STATUS POST KNEE REPLACEMENT, UNSPECIFIED LATERALITY: Primary | ICD-10-CM

## 2020-01-02 DIAGNOSIS — Z96.659 STATUS POST KNEE REPLACEMENT, UNSPECIFIED LATERALITY: ICD-10-CM

## 2020-01-02 DIAGNOSIS — Z96.651 S/P TOTAL KNEE REPLACEMENT USING CEMENT, RIGHT: Primary | ICD-10-CM

## 2020-01-02 PROCEDURE — 99999 PR PBB SHADOW E&M-EST. PATIENT-LVL III: ICD-10-PCS | Mod: PBBFAC,HCNC,, | Performed by: ORTHOPAEDIC SURGERY

## 2020-01-02 PROCEDURE — 73562 X-RAY EXAM OF KNEE 3: CPT | Mod: TC,HCNC,RT

## 2020-01-02 PROCEDURE — 99024 PR POST-OP FOLLOW-UP VISIT: ICD-10-PCS | Mod: HCNC,S$GLB,, | Performed by: ORTHOPAEDIC SURGERY

## 2020-01-02 PROCEDURE — 99024 POSTOP FOLLOW-UP VISIT: CPT | Mod: HCNC,S$GLB,, | Performed by: ORTHOPAEDIC SURGERY

## 2020-01-02 PROCEDURE — 73562 X-RAY EXAM OF KNEE 3: CPT | Mod: 26,HCNC,RT, | Performed by: RADIOLOGY

## 2020-01-02 PROCEDURE — 99999 PR PBB SHADOW E&M-EST. PATIENT-LVL III: CPT | Mod: PBBFAC,HCNC,, | Performed by: ORTHOPAEDIC SURGERY

## 2020-01-02 PROCEDURE — 73562 XR KNEE 3 VIEW RIGHT: ICD-10-PCS | Mod: 26,HCNC,RT, | Performed by: RADIOLOGY

## 2020-01-02 NOTE — PROGRESS NOTES
Subjective:     HPI:   Delmy Fairbanks is a 69 y.o. female who presents 6 weeks out from right total knee replacement November 18, 2019    Overall she seems to be doing well she does complain of some pain seems neuropathic in nature.  She is off her pain medication no assistive devices her therapy is ongoing she transition from home health to outpatient     Objective:   Exam:  Walking well no limp nonantalgic gait incisions well-healed 5-130 degree knee range of motion 6° valgus alignment knee stable anterior-posterior varus and valgus stresses slight flexion contracture no extensor lag no hypersensitivity to light touch      Imaging:  Indication:  Exam status post right total knee arthroplasty  Exam Ordered: Radiographs of the right knee include a standing anteroposterior view, a lateral view in full flexion, and a sunrise view  Details of Examination: Todays exam show a well fixed, well positioned total knee arthroplasty with no evidence of wear, osteolysis, or loosening.  Impression:  Status post right total knee arthroplasty, implant in good position with no abnormality        Assessment:     S/P total knee replacement using cement, right [Z96.651]       Plan:       Patient is doing very well with their total knee arthroplasty.  They will continue with their routine care of the knee replacement and see me back for their follow-up at the routine interval.  If there are problems in the interim they will see me back sooner.    We discussed desensitization activities and taking Tylenol and Aleve and appropriate doses for those    We can see her back in 6 weeks for 3 month x-rays I would like a  at that visit    No orders of the defined types were placed in this encounter.      Past Medical History:   Diagnosis Date    Arthritis     Hypertension        Past Surgical History:   Procedure Laterality Date    COLONOSCOPY  01/2019    TOTAL KNEE ARTHROPLASTY Right 11/18/2019    Procedure: ARTHROPLASTY,  KNEE, TOTAL-DEPUY-SIGMA/DEPUY-TC3/MBT back up;  Surgeon: Gene Baker III, MD;  Location: [facility];  Service: Orthopedics;  Laterality: Right;       No family history on file.    Social History     Socioeconomic History    Marital status:      Spouse name: Not on file    Number of children: Not on file    Years of education: Not on file    Highest education level: Not on file   Occupational History    Not on file   Social Needs    Financial resource strain: Not on file    Food insecurity:     Worry: Not on file     Inability: Not on file    Transportation needs:     Medical: Not on file     Non-medical: Not on file   Tobacco Use    Smoking status: Current Every Day Smoker     Types: Cigarettes    Smokeless tobacco: Never Used   Substance and Sexual Activity    Alcohol use: Not Currently    Drug use: Never    Sexual activity: Not on file   Lifestyle    Physical activity:     Days per week: Not on file     Minutes per session: Not on file    Stress: Not on file   Relationships    Social connections:     Talks on phone: Not on file     Gets together: Not on file     Attends [employer] service: Not on file     Active member of club or organization: Not on file     Attends meetings of clubs or organizations: Not on file     Relationship status: Not on file   Other Topics Concern    Not on file   Social History Narrative    Not on file

## 2020-01-03 ENCOUNTER — CLINICAL SUPPORT (OUTPATIENT)
Dept: REHABILITATION | Facility: HOSPITAL | Age: 70
End: 2020-01-03
Payer: MEDICARE

## 2020-01-03 DIAGNOSIS — R26.2 DIFFICULTY WALKING: ICD-10-CM

## 2020-01-03 DIAGNOSIS — M25.561 RIGHT KNEE PAIN, UNSPECIFIED CHRONICITY: ICD-10-CM

## 2020-01-03 PROCEDURE — 97140 MANUAL THERAPY 1/> REGIONS: CPT | Mod: HCNC,PN

## 2020-01-03 PROCEDURE — 97110 THERAPEUTIC EXERCISES: CPT | Mod: HCNC,PN

## 2020-01-03 NOTE — PROGRESS NOTES
"  Physical Therapy Daily Treatment Note     Name: Delmy Fairbanks  Clinic Number: [id number]    Therapy Diagnosis:   Encounter Diagnoses   Name Primary?    Right knee pain, unspecified chronicity     Difficulty walking      Physician: Gene Baker III, *    Visit Date: 1/3/2020    Physician Orders: PT Eval and Treat   Medical Diagnosis: Z96.651 (ICD-10-CM) - S/P total knee replacement using cement, right  Date of Surgery: 11/18/2019     Evaluation Date: 12/11/2019  Authorization Period Expiration: 12/31/2019  Plan of Care Certification Period: 1/31/2020  Visit # / Visits authorized: 4/ 20   Foto: 4/5  Cost: 118.42  Total: 380.36    Time In: 9:03 AM   Time Out: 10:00 AM   Total Billable Time: 57 minutes (3TE, 1 MT)    Precautions: Standard    Subjective     Pt reports: no pain walking in here and still does get some hamstring cramping mainly at night  She was compliant with home exercise program.  Response to previous treatment: initial evaluation previous  Functional change: none stated    Pain: 0/10  Location: right knee      Objective   Goniometrics: AROM 2*-116 R knee     Delmy received therapeutic exercisesto develop strength, ROM, flexibility and core stabilization for 47 minutes including:  - Hamstring st  15"x3  - Hip adduction iso 5"x20  - Quadriceps set  10"x10  - Heel slides  10"x10 w/ B straps  - LAQ   2x10 R  - Nu step  (Level 4) x 8 minutes (Bike today)  - SLR 3 way     2x10 1.5 #  - Prone TKE  2x10    - Step ups  2x10 B, L1, no UE use  - Heel raises  2x15 DL  - Lateral step downs 2x10 R, L1, 1 UE use  - Calf fitter stretch 3x30'' DL  - Standing TKEs 15x5'', purple cook band  - Leg press  2x10 DL, 4 plates, sled at 4    Delmy received the following manual therapy techniques: Joint mobilizations and Soft tissue Mobilization were applied to the: Right knee for 10 minutes, including:  -knee extension and flexion mobs, Grade III-IV  -patellar mobs in all directions, Grade I-III    Home Exercises " Provided and Patient Education Provided   Education provided:   - cont HEP     Written Home Exercises Provided: Patient instructed to cont prior HEP.  Exercises were reviewed and Delmy was able to demonstrate them prior to the end of the session.  Delmy demonstrated good  understanding of the education provided.     See EMR under Patient Instructions for exercises provided prior visit.    Assessment     Pt did very well today and tolerated added standing exercises with minimal pain. Minimal cues for form throughout.  Delmy is progressing well towards her goals.   Pt prognosis is Good.     Pt will continue to benefit from skilled outpatient physical therapy to address the deficits listed in the problem list box on initial evaluation, provide pt/family education and to maximize pt's level of independence in the home and community environment.   Pt's spiritual, cultural and educational needs considered and pt agreeable to plan of care and goals.     Anticipated barriers to physical therapy: see precautions.     Goals:     Short Term Goals (4 Weeks):  - Pt will increase (R) knee ROM to >75% of the contralateral side for improved gait mechanics (Ongoing, Not Met)  - Pt will increase RLE strength to >4/5 for improved stability withstanding ADL's(Ongoing, Not Met)  - Decrease gross Pain by 50% for improve dtolerance to community ambulation (Ongoing, Not Met)  - Pt independent with HEP to improve tolerance to exercise progressions. (Ongoing, Not Met)       Long Term Goals (8 Weeks):  - Pt will increase (R) knee ROM to 90% of the contralateral side for improved gait and squatting mechanics(Ongoing, Not Met)  - Resolution of edema (Ongoing, Not Met)  - Pt will report improvement in overall functional abilities, evidenced by improved score on  FOTO to 40% limitation or better. (Ongoing, Not Met)         Plan     Cont PT as per POC, progress    Manuel Madrigal, PT

## 2020-01-08 ENCOUNTER — CLINICAL SUPPORT (OUTPATIENT)
Dept: REHABILITATION | Facility: HOSPITAL | Age: 70
End: 2020-01-08
Payer: MEDICARE

## 2020-01-08 DIAGNOSIS — M25.561 RIGHT KNEE PAIN, UNSPECIFIED CHRONICITY: ICD-10-CM

## 2020-01-08 DIAGNOSIS — R26.2 DIFFICULTY WALKING: ICD-10-CM

## 2020-01-08 PROCEDURE — 97140 MANUAL THERAPY 1/> REGIONS: CPT | Mod: HCNC,PN

## 2020-01-08 PROCEDURE — 97110 THERAPEUTIC EXERCISES: CPT | Mod: HCNC,PN

## 2020-01-08 NOTE — PROGRESS NOTES
"  Physical Therapy Daily Treatment Note     Name: Delmy Fairbanks  Clinic Number: [id number]    Therapy Diagnosis:   Encounter Diagnoses   Name Primary?    Right knee pain, unspecified chronicity     Difficulty walking      Physician: Gene Baker III, *    Visit Date: 1/8/2020    Physician Orders: PT Eval and Treat   Medical Diagnosis: Z96.651 (ICD-10-CM) - S/P total knee replacement using cement, right  Date of Surgery: 11/18/2019     Evaluation Date: 12/11/2019  Authorization Period Expiration: 12/31/2019  Plan of Care Certification Period: 1/31/2020  Visit # / Visits authorized: 5/ 20   Foto: 5/5 Next    Time In: 9:03 AM   Time Out: 10:00 AM   Total Billable Time: 57 minutes (1TE, 1 MT)    Precautions: Standard    Subjective     Pt reports no new complaints and has been active throughout the day.   She was compliant with home exercise program.  Response to previous treatment: initial evaluation previous  Functional change: none stated    Pain: 0/10  Location: right knee      Objective   Goniometrics: AROM 0*-116 R knee     Delmy received therapeutic exercisesto develop strength, ROM, flexibility and core stabilization for 47 minutes including:  - Hamstring st  15"x3  - Hip adduction iso 5"x20  - Quadriceps set  10"x10  - Heel slides  10"x10 w/ B straps  - LAQ   2x10 R  - Nu step  (Level 4) x 8 minutes (Bike today)  - SLR 3 way     2x10 1.5 # (Progressed to 2#)  - Prone TKE  2x10    - Step ups  2x10 B, L1, no UE use  - Heel raises  2x15 DL  - Lateral step downs 2x10 R, L1, 1 UE use  - Calf fitter stretch 3x30'' DL  - Standing TKEs 15x5'', purple cook band  - Leg press  2x10 DL, 4 plates, sled at 4  - Assisted [place]    Delmy received the following manual therapy techniques: Joint mobilizations and Soft tissue Mobilization were applied to the: Right knee for 10 minutes, including:  -knee extension and flexion mobs, Grade III-IV  -patellar mobs in all directions, Grade I-III    Home Exercises Provided " and Patient Education Provided   Education provided:   - cont HEP     Written Home Exercises Provided: Patient instructed to cont prior HEP.  Exercises were reviewed and Delmy was able to demonstrate them prior to the end of the session.  Delmy demonstrated good  understanding of the education provided.     See EMR under Patient Instructions for exercises provided prior visit.    Assessment     Pt tolerated today's activity well and was compliant with all requested tasks. Progressing well with regard to strength, ROM, and function.  Delmy is progressing well towards her goals.   Pt prognosis is Good.     Pt will continue to benefit from skilled outpatient physical therapy to address the deficits listed in the problem list box on initial evaluation, provide pt/family education and to maximize pt's level of independence in the home and community environment.   Pt's spiritual, cultural and educational needs considered and pt agreeable to plan of care and goals.     Anticipated barriers to physical therapy: see precautions.     Goals:     Short Term Goals (4 Weeks):  - Pt will increase (R) knee ROM to >75% of the contralateral side for improved gait mechanics (Ongoing, Not Met)  - Pt will increase RLE strength to >4/5 for improved stability withstanding ADL's(Ongoing, Not Met)  - Decrease gross Pain by 50% for improve dtolerance to community ambulation (Ongoing, Not Met)  - Pt independent with HEP to improve tolerance to exercise progressions. (Ongoing, Not Met)       Long Term Goals (8 Weeks):  - Pt will increase (R) knee ROM to 90% of the contralateral side for improved gait and squatting mechanics(Ongoing, Not Met)  - Resolution of edema (Ongoing, Not Met)  - Pt will report improvement in overall functional abilities, evidenced by improved score on  FOTO to 40% limitation or better. (Ongoing, Not Met)         Plan     Cont PT as per POC, progress    Brad Garcia, PT

## 2020-01-15 ENCOUNTER — CLINICAL SUPPORT (OUTPATIENT)
Dept: REHABILITATION | Facility: HOSPITAL | Age: 70
End: 2020-01-15
Payer: MEDICARE

## 2020-01-15 DIAGNOSIS — R26.2 DIFFICULTY WALKING: ICD-10-CM

## 2020-01-15 DIAGNOSIS — M25.561 RIGHT KNEE PAIN, UNSPECIFIED CHRONICITY: ICD-10-CM

## 2020-01-15 PROCEDURE — 97110 THERAPEUTIC EXERCISES: CPT | Mod: HCNC,PN,CQ

## 2020-01-15 NOTE — PROGRESS NOTES
"  Physical Therapy Daily Treatment Note     Name: Delmy Fairbanks  Clinic Number: [id number]    Therapy Diagnosis:   Encounter Diagnoses   Name Primary?    Right knee pain, unspecified chronicity     Difficulty walking      Physician: Gene Baker III, *    Visit Date: 1/15/2020    Physician Orders: PT Eval and Treat   Medical Diagnosis: Z96.651 (ICD-10-CM) - S/P total knee replacement using cement, right  Date of Surgery: 11/18/2019     Evaluation Date: 12/11/2019  Authorization Period Expiration: 12/31/2019  Plan of Care Certification Period: 1/31/2020  Visit # / Visits authorized: 5/ 20   Foto: 5/5 Next    Time In: 9:05 AM   Time Out: 10:00 AM   Total Billable Time: 30 minutes (2TE)    Precautions: Standard    Subjective     Pt reports no new complaints and is doing HEP  She was compliant with home exercise program.  Response to previous treatment: initial evaluation previous  Functional change: none stated    Pain: 0/10  Location: right knee      Objective       Delmy received therapeutic exercisesto develop strength, ROM, flexibility and core stabilization for 50 minutes including:  - Hamstring st  15"x3  - Hip adduction iso 5"x20  - Quadriceps set  10"x10  - Heel slides  10"x10 w/ B straps  - LAQ   2x10 R  - Nu step  (Level 4) x 8 minutes (Bike today)  - SLR 3 way     2x10 2#  - Prone TKE  2x10    - Step ups  2x10 B, L1, no UE use  - Heel raises  2x15 DL  - Lateral step downs 2x10 R, L1, 1 UE use  - Calf fitter stretch 3x30'' DL  - Standing TKEs 15x5'', purple cook band  - Leg press  2x10 DL, 4 plates, sled at 4  - Assisted [place] NEXT    Delmy received the following manual therapy techniques: Joint mobilizations and Soft tissue Mobilization were applied to the: Right knee for 0 minutes, including:  Not performed today-knee extension and flexion mobs, Grade III-IV  -patellar mobs in all directions, Grade I-III    Home Exercises Provided and Patient Education Provided   Education provided:   - cont " HEP     Written Home Exercises Provided: Patient instructed to cont prior HEP.  Exercises were reviewed and Delmy was able to demonstrate them prior to the end of the session.  Delmy demonstrated good  understanding of the education provided.     See EMR under Patient Instructions for exercises provided prior visit.    Assessment     Pt tolerated today's activity well and was able to perform all therex  Without complaint of pain or difficulty. Progressing well with towards goals for strength, ROM, and function.  Delmy is progressing well towards her goals.   Pt prognosis is Good.     Pt will continue to benefit from skilled outpatient physical therapy to address the deficits listed in the problem list box on initial evaluation, provide pt/family education and to maximize pt's level of independence in the home and community environment.   Pt's spiritual, cultural and educational needs considered and pt agreeable to plan of care and goals.     Anticipated barriers to physical therapy: see precautions.     Goals:     Short Term Goals (4 Weeks):  - Pt will increase (R) knee ROM to >75% of the contralateral side for improved gait mechanics (Ongoing, Not Met)  - Pt will increase RLE strength to >4/5 for improved stability withstanding ADL's(Ongoing, Not Met)  - Decrease gross Pain by 50% for improve dtolerance to community ambulation (Ongoing, Not Met)  - Pt independent with HEP to improve tolerance to exercise progressions. (Ongoing, Not Met)       Long Term Goals (8 Weeks):  - Pt will increase (R) knee ROM to 90% of the contralateral side for improved gait and squatting mechanics(Ongoing, Not Met)  - Resolution of edema (Ongoing, Not Met)  - Pt will report improvement in overall functional abilities, evidenced by improved score on  FOTO to 40% limitation or better. (Ongoing, Not Met)         Plan     Cont PT as per POC, progress towards established goals as tolerated.    Marguerite Navarro, PTA

## 2020-01-22 ENCOUNTER — CLINICAL SUPPORT (OUTPATIENT)
Dept: REHABILITATION | Facility: HOSPITAL | Age: 70
End: 2020-01-22
Payer: MEDICARE

## 2020-01-22 DIAGNOSIS — R26.2 DIFFICULTY WALKING: ICD-10-CM

## 2020-01-22 DIAGNOSIS — M25.561 RIGHT KNEE PAIN, UNSPECIFIED CHRONICITY: ICD-10-CM

## 2020-01-22 PROCEDURE — 97110 THERAPEUTIC EXERCISES: CPT | Mod: HCNC,PN

## 2020-01-22 NOTE — PROGRESS NOTES
"  Physical Therapy Daily Treatment Note/Discharge Note     Name: Delmy Fairbanks  Clinic Number: [id number]    Therapy Diagnosis:   Encounter Diagnoses   Name Primary?    Right knee pain, unspecified chronicity     Difficulty walking      Physician: Gene Baker III, *    Visit Date: 1/22/2020    Physician Orders: PT Eval and Treat   Medical Diagnosis: Z96.651 (ICD-10-CM) - S/P total knee replacement using cement, right  Date of Surgery: 11/18/2019     Evaluation Date: 12/11/2019  Authorization Period Expiration: 12/31/2019  Plan of Care Certification Period: 1/31/2020  Visit # / Visits authorized: 7/ 20   Foto: 5/5 Next    Time In: 1000 AM   Time Out: 1100 AM   Total Billable Time: 30 minutes (2TE)    Precautions: Standard    Subjective     Pt reports no new complaints and is doing HEP. Feels she is capable of discharge from skilled PT.  She was compliant with home exercise program.  Response to previous treatment: initial evaluation previous  Functional change: none stated    Pain: 0/10  Location: right knee      Objective     ROM: WNL  Strength: > 4/5   Timed up and go: < 13 sec  30 sec sit<>stand:10    Delmy received therapeutic exercisesto develop strength, ROM, flexibility and core stabilization for 50 minutes including:  - Hamstring st  15"x3  - Hip adduction iso 5"x20  - Quadriceps set  10"x10  - Heel slides  10"x10 w/ B straps  - LAQ   2x10 R  - Nu step  (Level 4) x 8 minutes (Bike today)  - SLR 3 way     2x10 2#  - Prone TKE  2x10    - Step ups  2x10 B, L1, no UE use  - Heel raises  2x15 DL  - Lateral step downs 2x10 R, L1, 1 UE use  - Calf fitter stretch 3x30'' DL  - Standing TKEs 15x5'', purple cook band  - Leg press  2x10 DL, 4 plates, sled at 4  - Assisted [place] NEXT    Delmy received the following manual therapy techniques: Joint mobilizations and Soft tissue Mobilization were applied to the: Right knee for 0 minutes, including:  Not performed today-knee extension and flexion mobs, Grade " III-IV  -patellar mobs in all directions, Grade I-III    Home Exercises Provided and Patient Education Provided   Education provided:   - cont HEP     Written Home Exercises Provided: Patient instructed to cont prior HEP.  Exercises were reviewed and Delmy was able to demonstrate them prior to the end of the session.  Delmy demonstrated good  understanding of the education provided.     See EMR under Patient Instructions for exercises provided prior visit.    Assessment     Pt has had an excellent recovery from TKR. She has met established goals and demonstrates an appropriate understanding of her HEP. Appropriate for d/c at this time.  Delmy is progressing well towards her goals.   Pt prognosis is Good.          Anticipated barriers to physical therapy: see precautions.     Goals:     Short Term Goals (4 Weeks):  - Pt will increase (R) knee ROM to >75% of the contralateral side for improved gait mechanics (Met)  - Pt will increase RLE strength to >4/5 for improved stability withstanding ADL's(Met)  - Decrease gross Pain by 50% for improve dtolerance to community ambulation (Met)  - Pt independent with HEP to improve tolerance to exercise progressions. (Met)       Long Term Goals (8 Weeks):  - Pt will increase (R) knee ROM to 90% of the contralateral side for improved gait and squatting mechanics( Met)  - Resolution of edema (Met)  - Pt will report improvement in overall functional abilities, evidenced by improved score on  FOTO to 40% limitation or better. (d/c)         Plan     D/c from PT fab Garcia PT

## 2020-02-11 DIAGNOSIS — Z96.659 STATUS POST KNEE REPLACEMENT, UNSPECIFIED LATERALITY: Primary | ICD-10-CM

## 2020-02-13 ENCOUNTER — OFFICE VISIT (OUTPATIENT)
Dept: ORTHOPEDICS | Facility: CLINIC | Age: 70
End: 2020-02-13
Payer: MEDICARE

## 2020-02-13 ENCOUNTER — HOSPITAL ENCOUNTER (OUTPATIENT)
Dept: RADIOLOGY | Facility: HOSPITAL | Age: 70
Discharge: HOME | End: 2020-02-13
Attending: ORTHOPAEDIC SURGERY
Payer: MEDICARE

## 2020-02-13 VITALS — HEIGHT: 63 IN | WEIGHT: 172.19 LBS | BODY MASS INDEX: 30.51 KG/M2

## 2020-02-13 DIAGNOSIS — Z96.651 S/P TOTAL KNEE REPLACEMENT USING CEMENT, RIGHT: Primary | ICD-10-CM

## 2020-02-13 DIAGNOSIS — Z96.659 STATUS POST KNEE REPLACEMENT, UNSPECIFIED LATERALITY: ICD-10-CM

## 2020-02-13 PROCEDURE — 99024 PR POST-OP FOLLOW-UP VISIT: ICD-10-PCS | Mod: HCNC,S$GLB,, | Performed by: ORTHOPAEDIC SURGERY

## 2020-02-13 PROCEDURE — 99024 POSTOP FOLLOW-UP VISIT: CPT | Mod: HCNC,S$GLB,, | Performed by: ORTHOPAEDIC SURGERY

## 2020-02-13 PROCEDURE — 73560 XR KNEE 1 OR 2 VIEW RIGHT: ICD-10-PCS | Mod: 26,HCNC,RT, | Performed by: RADIOLOGY

## 2020-02-13 PROCEDURE — 73560 X-RAY EXAM OF KNEE 1 OR 2: CPT | Mod: 26,HCNC,RT, | Performed by: RADIOLOGY

## 2020-02-13 PROCEDURE — 73560 X-RAY EXAM OF KNEE 1 OR 2: CPT | Mod: TC,HCNC,RT

## 2020-02-13 PROCEDURE — 99999 PR PBB SHADOW E&M-EST. PATIENT-LVL III: ICD-10-PCS | Mod: PBBFAC,HCNC,, | Performed by: ORTHOPAEDIC SURGERY

## 2020-02-13 PROCEDURE — 99999 PR PBB SHADOW E&M-EST. PATIENT-LVL III: CPT | Mod: PBBFAC,HCNC,, | Performed by: ORTHOPAEDIC SURGERY

## 2020-02-13 NOTE — PROGRESS NOTES
Subjective:     HPI:   Delmy Fairbanks is a 69 y.o. female who presents twelve weeks out from right total knee replacement November 18, 2019.    Overall she is doing quite well.  She does state that the lateral aspect of the knee is numb which we described would be normal and expected. She has no pain in the knee she is taking Tylenol only as needed no assistive devices and she has finished her physical therapy no limits due to the knee overall she seems quite happy     Objective:   Exam:  No limp nonantalgic gait incisions well-healed 3135 degree knee range of motion 6° valgus alignment knee stable anterior-posterior varus and valgus stresses slight flexion contracture but no extensor lag preoperative range of motion was 7-125      Imaging:  Indication:  Exam status post right total knee arthroplasty  Exam Ordered: Radiographs of the right knee include a standing anteroposterior view, a lateral view in full flexion, and a sunrise view  Details of Examination: Todays exam show a well fixed, well positioned total knee arthroplasty with no evidence of wear, osteolysis, or loosening.  Impression:  Status post right total knee arthroplasty, implant in good position with no abnormality        Assessment:     S/P total knee replacement using cement, right [Z96.651]       Plan:       Patient is doing very well with their total knee arthroplasty.  They will continue with their routine care of the knee replacement and see me back for their follow-up at the routine interval.  If there are problems in the interim they will see me back sooner.    Follow-up in 9 months for 1 year x-rays    No orders of the defined types were placed in this encounter.      Past Medical History:   Diagnosis Date    Arthritis     Hypertension        Past Surgical History:   Procedure Laterality Date    COLONOSCOPY  01/2019    TOTAL KNEE ARTHROPLASTY Right 11/18/2019    Procedure: ARTHROPLASTY, KNEE, TOTAL-DEPUY-SIGMA/DEPUY-TC3/MBT back  up;  Surgeon: Gene Baker III, MD;  Location: [facility];  Service: Orthopedics;  Laterality: Right;       History reviewed. No pertinent family history.    Social History     Socioeconomic History    Marital status:      Spouse name: Not on file    Number of children: Not on file    Years of education: Not on file    Highest education level: Not on file   Occupational History    Not on file   Social Needs    Financial resource strain: Not on file    Food insecurity:     Worry: Not on file     Inability: Not on file    Transportation needs:     Medical: Not on file     Non-medical: Not on file   Tobacco Use    Smoking status: Never Smoker    Smokeless tobacco: Never Used   Substance and Sexual Activity    Alcohol use: Not Currently    Drug use: Never    Sexual activity: Not on file   Lifestyle    Physical activity:     Days per week: Not on file     Minutes per session: Not on file    Stress: Not on file   Relationships    Social connections:     Talks on phone: Not on file     Gets together: Not on file     Attends Mu-ism service: Not on file     Active member of club or organization: Not on file     Attends meetings of clubs or organizations: Not on file     Relationship status: Not on file   Other Topics Concern    Not on file   Social History Narrative    Not on file

## 2020-04-21 ENCOUNTER — TELEPHONE (OUTPATIENT)
Dept: ORTHOPEDICS | Facility: CLINIC | Age: 70
End: 2020-04-21

## 2020-05-13 ENCOUNTER — TELEPHONE (OUTPATIENT)
Dept: ORTHOPEDICS | Facility: CLINIC | Age: 70
End: 2020-05-13

## (undated) DEVICE — SYR 50CC LL

## (undated) DEVICE — PUMP COLD THERAPY

## (undated) DEVICE — SUT QUILL 2T11 36IN

## (undated) DEVICE — BLADE SAGITTAL 18 X 1.27 X 90M

## (undated) DEVICE — GLOVE BIOGEL SKINSENSE PI 8.0

## (undated) DEVICE — SPONGE GAUZE 16PLY 4X4

## (undated) DEVICE — TOWEL OR XRAY WHITE 17X26IN

## (undated) DEVICE — SOL IRR NACL .9% 3000ML

## (undated) DEVICE — SUT MONOCYRL 4-0 PS2 UND

## (undated) DEVICE — NDL SPINAL 18GX3.5 SPINOCAN

## (undated) DEVICE — DRESSING TRANS 4X4 TEGADERM

## (undated) DEVICE — CATH SUCTION 10FR

## (undated) DEVICE — STOCKING ANTI-EMBOLIC LG REG

## (undated) DEVICE — SOL BETADINE 5%

## (undated) DEVICE — DRESSING AQUACEL AG RBBN 2X45

## (undated) DEVICE — ELECTRODE REM PLYHSV RETURN 9

## (undated) DEVICE — SUT 1 36IN COATED VICRYL UN

## (undated) DEVICE — SYS KNEE EPAK PIN ATTUNE
Type: IMPLANTABLE DEVICE | Site: KNEE | Status: NON-FUNCTIONAL
Removed: 2019-11-18

## (undated) DEVICE — KIT IRR SUCTION HND PIECE

## (undated) DEVICE — BLADE RECIP RIBBED

## (undated) DEVICE — DRAPE SURG W/TWL 17 5/8X23

## (undated) DEVICE — Device

## (undated) DEVICE — KIT TOTAL KNEE TKOFG

## (undated) DEVICE — GAUZE SPONGE 4X4 12PLY

## (undated) DEVICE — MARKER SKIN STND TIP BLUE BARR

## (undated) DEVICE — SEE MEDLINE ITEM 157144

## (undated) DEVICE — DRESSING TELFA N ADH 3X8

## (undated) DEVICE — UNDERGLOVES BIOGEL PI SIZE 8.5

## (undated) DEVICE — SEE MEDLINE ITEM 152548

## (undated) DEVICE — MASK FLYTE HOOD PEEL AWAY

## (undated) DEVICE — SEE MEDLINE ITEM 157131

## (undated) DEVICE — SEE MEDLINE ITEM 146298

## (undated) DEVICE — PRESSURIZER HI VAC HIP KIT

## (undated) DEVICE — NDL 18GA X1 1/2 REG BEVEL

## (undated) DEVICE — DRAPE INCISE IOBAN 2 23X33IN

## (undated) DEVICE — SUT 2/0 36IN COATED VICRYL

## (undated) DEVICE — GOWN SMARTGOWN 3XL XLONG

## (undated) DEVICE — CONTAINER SPECIMEN STRL 4OZ

## (undated) DEVICE — PAD COLD THERAPY KNEE WRAP ON

## (undated) DEVICE — ADHESIVE DERMABOND ADVANCED

## (undated) DEVICE — TAPE SILK 3IN

## (undated) DEVICE — DRESSING TELFA STRL 4X3 LF